# Patient Record
Sex: MALE | Race: ASIAN | NOT HISPANIC OR LATINO | Employment: FULL TIME | URBAN - METROPOLITAN AREA
[De-identification: names, ages, dates, MRNs, and addresses within clinical notes are randomized per-mention and may not be internally consistent; named-entity substitution may affect disease eponyms.]

---

## 2017-12-03 ENCOUNTER — HOSPITAL ENCOUNTER (EMERGENCY)
Facility: OTHER | Age: 22
Discharge: HOME | End: 2017-12-03
Attending: EMERGENCY MEDICINE
Payer: COMMERCIAL

## 2017-12-03 VITALS
HEIGHT: 65 IN | HEART RATE: 58 BPM | BODY MASS INDEX: 25.99 KG/M2 | RESPIRATION RATE: 18 BRPM | WEIGHT: 156 LBS | SYSTOLIC BLOOD PRESSURE: 99 MMHG | DIASTOLIC BLOOD PRESSURE: 45 MMHG | OXYGEN SATURATION: 100 % | TEMPERATURE: 98 F

## 2017-12-03 DIAGNOSIS — R10.9 ABDOMINAL PAIN: ICD-10-CM

## 2017-12-03 LAB
ALBUMIN SERPL-MCNC: 4.1 G/DL (ref 3.3–5.5)
ALBUMIN SERPL-MCNC: 4.3 G/DL (ref 3.3–5.5)
ALP SERPL-CCNC: 43 U/L (ref 42–141)
ALP SERPL-CCNC: 51 U/L (ref 42–141)
BILIRUB SERPL-MCNC: 0.6 MG/DL (ref 0.2–1.6)
BILIRUB SERPL-MCNC: 0.7 MG/DL (ref 0.2–1.6)
BUN SERPL-MCNC: 15 MG/DL (ref 7–22)
CALCIUM SERPL-MCNC: 9.5 MG/DL (ref 8–10.3)
CHLORIDE SERPL-SCNC: 100 MMOL/L (ref 98–108)
CREAT SERPL-MCNC: 1 MG/DL (ref 0.6–1.2)
GLUCOSE SERPL-MCNC: 89 MG/DL (ref 73–118)
HCO3 UR-SCNC: 26.5 MMOL/L (ref 24–28)
LDH SERPL L TO P-CCNC: 4.4 MMOL/L (ref 0.5–2.2)
PCO2 BLDA: 43.4 MMHG (ref 35–45)
PH SMN: 7.39 [PH] (ref 7.35–7.45)
PO2 BLDA: 34 MMHG (ref 40–60)
POC ALT (SGPT): 39 U/L (ref 10–47)
POC ALT (SGPT): 42 U/L (ref 10–47)
POC AMYLASE: 82 U/L (ref 14–97)
POC AST (SGOT): 67 U/L (ref 11–38)
POC AST (SGOT): 71 U/L (ref 11–38)
POC BE: 2 MMOL/L
POC GGT: 7 U/L (ref 5–65)
POC SATURATED O2: 65 % (ref 95–100)
POC TCO2: 27 MMOL/L (ref 18–33)
POC TCO2: 28 MMOL/L (ref 24–29)
POTASSIUM BLD-SCNC: 3.6 MMOL/L (ref 3.6–5.1)
PROTEIN, POC: 8 G/DL (ref 6.4–8.1)
PROTEIN, POC: 8.3 G/DL (ref 6.4–8.1)
SAMPLE: ABNORMAL
SODIUM BLD-SCNC: 142 MMOL/L (ref 128–145)

## 2017-12-03 PROCEDURE — 81003 URINALYSIS AUTO W/O SCOPE: CPT

## 2017-12-03 PROCEDURE — 96375 TX/PRO/DX INJ NEW DRUG ADDON: CPT

## 2017-12-03 PROCEDURE — 82040 ASSAY OF SERUM ALBUMIN: CPT

## 2017-12-03 PROCEDURE — 96361 HYDRATE IV INFUSION ADD-ON: CPT

## 2017-12-03 PROCEDURE — 85025 COMPLETE CBC W/AUTO DIFF WBC: CPT

## 2017-12-03 PROCEDURE — 82803 BLOOD GASES ANY COMBINATION: CPT

## 2017-12-03 PROCEDURE — 25000003 PHARM REV CODE 250: Performed by: EMERGENCY MEDICINE

## 2017-12-03 PROCEDURE — 63600175 PHARM REV CODE 636 W HCPCS: Performed by: EMERGENCY MEDICINE

## 2017-12-03 PROCEDURE — 25500020 PHARM REV CODE 255

## 2017-12-03 PROCEDURE — 99284 EMERGENCY DEPT VISIT MOD MDM: CPT | Mod: 25

## 2017-12-03 PROCEDURE — 80053 COMPREHEN METABOLIC PANEL: CPT

## 2017-12-03 PROCEDURE — 96374 THER/PROPH/DIAG INJ IV PUSH: CPT

## 2017-12-03 RX ORDER — MORPHINE SULFATE 10 MG/ML
6 INJECTION INTRAMUSCULAR; INTRAVENOUS; SUBCUTANEOUS
Status: COMPLETED | OUTPATIENT
Start: 2017-12-03 | End: 2017-12-03

## 2017-12-03 RX ORDER — ONDANSETRON 2 MG/ML
4 INJECTION INTRAMUSCULAR; INTRAVENOUS
Status: COMPLETED | OUTPATIENT
Start: 2017-12-03 | End: 2017-12-03

## 2017-12-03 RX ORDER — PANTOPRAZOLE SODIUM 20 MG/1
20 TABLET, DELAYED RELEASE ORAL DAILY
Qty: 30 TABLET | Refills: 0 | Status: SHIPPED | OUTPATIENT
Start: 2017-12-03 | End: 2018-12-03

## 2017-12-03 RX ORDER — SUCRALFATE 1 G/1
1 TABLET ORAL
Qty: 30 TABLET | Refills: 0 | Status: SHIPPED | OUTPATIENT
Start: 2017-12-03 | End: 2022-04-05 | Stop reason: CLARIF

## 2017-12-03 RX ADMIN — MORPHINE SULFATE 6 MG: 10 INJECTION INTRAVENOUS at 09:12

## 2017-12-03 RX ADMIN — ONDANSETRON 4 MG: 2 INJECTION INTRAMUSCULAR; INTRAVENOUS at 09:12

## 2017-12-03 RX ADMIN — IOHEXOL 100 ML: 350 INJECTION, SOLUTION INTRAVENOUS at 09:12

## 2017-12-03 RX ADMIN — SODIUM CHLORIDE 1000 ML: 0.9 INJECTION, SOLUTION INTRAVENOUS at 07:12

## 2017-12-03 RX ADMIN — LIDOCAINE HYDROCHLORIDE: 20 SOLUTION ORAL; TOPICAL at 07:12

## 2017-12-04 NOTE — ED TRIAGE NOTES
Pt presents to ER with c/o upper abd pain that started about 2 hours after eating accompanied by diarrhea but no nausea or vomiting.

## 2017-12-04 NOTE — ED PROVIDER NOTES
Encounter Date: 12/3/2017       History     Chief Complaint   Patient presents with    Abdominal Pain     pt presents to ED with c/o upper abd pain that started approx 2 hrs after eating with one episode of diarrhea. Denies any n/v at this time.     Diarrhea     The history is provided by the patient.   Abdominal Pain   The current episode started 2 to 3 hours ago. The onset of the illness was abrupt. The problem has not changed since onset.The abdominal pain is located in the epigastric region. The abdominal pain does not radiate. The severity of the abdominal pain is 8/10. The abdominal pain is relieved by nothing. The abdominal pain is exacerbated by eating. The other symptoms of the illness include nausea. The other symptoms of the illness do not include fever, melena, shortness of breath, vomiting, diarrhea, dysuria, hematemesis or hematochezia.   The illness is associated with eating (ate fried chicken just prior to onset of pain). Change in bowel habit: constipation earlier today. Additional symptoms associated with the illness include constipation. Symptoms associated with the illness do not include chills, urgency, hematuria, frequency or back pain.     Review of patient's allergies indicates:  No Known Allergies  History reviewed. No pertinent past medical history.  History reviewed. No pertinent surgical history.  History reviewed. No pertinent family history.  Social History   Substance Use Topics    Smoking status: Former Smoker    Smokeless tobacco: Never Used    Alcohol use Yes      Comment: ocassionally     Review of Systems   Constitutional: Negative for chills and fever.   HENT: Negative.    Respiratory: Negative for shortness of breath.    Cardiovascular: Negative for chest pain.   Gastrointestinal: Positive for abdominal pain, constipation and nausea. Negative for blood in stool, diarrhea, hematemesis, hematochezia, melena and vomiting.   Genitourinary: Negative for dysuria, frequency,  hematuria and urgency.   Musculoskeletal: Negative for back pain.   Neurological: Negative for dizziness and headaches.   All other systems reviewed and are negative.      Physical Exam     Initial Vitals [12/03/17 1908]   BP Pulse Resp Temp SpO2   -- (!) 57 18 97.6 °F (36.4 °C) 100 %      MAP       --         Physical Exam    Nursing note and vitals reviewed.  Constitutional: He appears well-developed and well-nourished. He is not diaphoretic. No distress.   HENT:   Head: Normocephalic and atraumatic.   Eyes: Conjunctivae are normal. Pupils are equal, round, and reactive to light.   Neck: Normal range of motion. Neck supple.   Cardiovascular: Regular rhythm and intact distal pulses.   Pulmonary/Chest: No accessory muscle usage. No tachypnea. No respiratory distress.   Abdominal: Soft. Bowel sounds are normal. He exhibits no distension. There is no tenderness. There is no rigidity, no rebound, no guarding, no tenderness at McBurney's point and negative Whitley's sign.   Musculoskeletal: Normal range of motion. He exhibits no tenderness.   Neurological: He is alert and oriented to person, place, and time.   Skin: Skin is warm and intact.   Psychiatric: He has a normal mood and affect.         ED Course   Procedures  Labs Reviewed   ISTAT PROCEDURE - Abnormal; Notable for the following:        Result Value    POC PO2 34 (*)     POC SATURATED O2 65 (*)     POC Lactate 4.40 (*)     All other components within normal limits   POCT LIVER PANEL - Abnormal; Notable for the following:     AST (SGOT), POC 71 (*)     Protein 8.3 (*)     All other components within normal limits   POCT CMP - Abnormal; Notable for the following:     AST (SGOT), POC 67 (*)     All other components within normal limits   POCT CBC   POCT URINALYSIS W/O SCOPE   POCT CMP   POCT LIVER PANEL             Medical Decision Making:   Differential Diagnosis:   Cholecystitis, pancreatitis, PUD, gastritis, peritonitis, others  Clinical Tests:   Lab Tests:  Ordered and Reviewed       <> Summary of Lab: White count 12.4 H&H 15.8 and 49.9 platelets 210 MCV is 84.3 RDW 12.4  Radiological Study: Ordered and Reviewed  ED Management:  I discussed with patient that the evaluation in the ED does not suggest any emergent or acutely life threatening condition requiring immediate intervention beyond what was provided in the ED, and I believe the patient is safe for discharge.  Regardless, an unremarkable evaluation in the ED does not preclude the development or presence of a serious or life threatening condition. As such, patient was instructed to return immediately for any worsening or change in current symptoms.                     ED Course as of Dec 03 2226   Sun Dec 03, 2017   2203 Symptoms improved with GI cocktail initially then recurred after several minutes. Symptoms then improved after Morphine.  [DL]      ED Course User Index  [DL] Ruth Toribio MD     Labs Reviewed  Admission on 12/03/2017   Component Date Value Ref Range Status    POC PH 12/03/2017 7.394  7.35 - 7.45 Final    POC PCO2 12/03/2017 43.4  35 - 45 mmHg Final    POC PO2 12/03/2017 34* 40 - 60 mmHg Final    POC HCO3 12/03/2017 26.5  24 - 28 mmol/L Final    POC BE 12/03/2017 2  -2 to 2 mmol/L Final    POC SATURATED O2 12/03/2017 65* 95 - 100 % Final    POC Lactate 12/03/2017 4.40* 0.5 - 2.2 mmol/L Final    POC TCO2 12/03/2017 28  24 - 29 mmol/L Final    Sample 12/03/2017 VENOUS   Final    Albumin, POC 12/03/2017 4.3  3.3 - 5.5 g/dL Final    Alkaline Phosphatase, POC 12/03/2017 51  42 - 141 U/L Final    ALT (SGPT), POC 12/03/2017 39  10 - 47 U/L Final    Amylase, POC 12/03/2017 82  14 - 97 U/L Final    AST (SGOT), POC 12/03/2017 71* 11 - 38 U/L Final    POC GGT 12/03/2017 7  5 - 65 U/L Final    Bilirubin 12/03/2017 0.6  0.2 - 1.6 mg/dL Final    Protein 12/03/2017 8.3* 6.4 - 8.1 g/dL Final    Albumin, POC 12/03/2017 4.1  3.3 - 5.5 g/dL Final    Alkaline Phosphatase, POC 12/03/2017 43  42 -  141 U/L Final    ALT (SGPT), POC 12/03/2017 42  10 - 47 U/L Final    AST (SGOT), POC 12/03/2017 67* 11 - 38 U/L Final    POC BUN 12/03/2017 15  7 - 22 mg/dL Final    Calcium, POC 12/03/2017 9.5  8.0 - 10.3 mg/dL Final    POC Chloride 12/03/2017 100  98 - 108 mmol/L Final    POC Creatinine 12/03/2017 1.0  0.6 - 1.2 mg/dL Final    POC Glucose 12/03/2017 89  73 - 118 mg/dL Final    POC Potassium 12/03/2017 3.6  3.6 - 5.1 mmol/L Final    POC Sodium 12/03/2017 142  128 - 145 mmol/L Final    Bilirubin 12/03/2017 0.7  0.2 - 1.6 mg/dL Final    POC TCO2 12/03/2017 27  18 - 33 mmol/L Final    Protein 12/03/2017 8.0  6.4 - 8.1 g/dL Final        Imaging Reviewed    Imaging Results          CT Abdomen Pelvis With Contrast (Final result)  Result time 12/03/17 21:32:50    Final result by Dain Peters MD (12/03/17 21:32:50)                 Impression:        1.  Right lower quadrant increased number of prominent but nonenlarged lymph nodes, nonspecific but could be seen with mesenteric adenitis in this age group.      Electronically signed by: DAIN PETERS MD, MD  Date:     12/03/17  Time:    21:32              Narrative:    CT of the abdomen and pelvis with 100 cc of Omnipaque 350 IV contrast. No oral contrast.    Results: Comparison made to abdominal radiograph earlier same day.    The lung bases are clear.  The visualized heart and pericardium are unremarkable.    Stomach is moderately distended with intraluminal contents without wall thickening or adjacent stranding. The liver, gallbladder, pancreas, spleen, duodenum, and adrenal glands are without significant abnormality.  No intrahepatic or extrahepatic biliary ductal dilatation. A small accessory splenule.    The kidneys are normal in size, shape and location with symmetric enhancement.  No hydronephrosis.   The urinary bladder is within normal limits.  Prostate and seminal vesicles are within normal limits.    No ascites or free air.  Increased number of  prominent but not enlarged lymph nodes within the right lower quadrant. No lymphadenopathy by CT criteria.    The appendix and terminal ileum appear within normal limits.  The small and large loops of bowel are normal in caliber without focal wall thickening or adjacent fat stranding.  No pneumatosis or portal venous gas.    The aorta tapers appropriately in its descent through the abdomen.    Well-corticated deformity at the right transverse process of L3 suggesting nonunion related to remote trauma. Chronic appearing minimal anterior wedge deformity of L1 and L2 vertebral bodies. No acute or destructive osseous process seen.                             X-Ray Abdomen Flat And Erect (Final result)  Result time 12/03/17 19:34:22    Final result by Dain Peters MD (12/03/17 19:34:22)                 Impression:         Nonobstructive bowel gas pattern.      Electronically signed by: DAIN PETERS MD, MD  Date:     12/03/17  Time:    19:34              Narrative:    Comparison: None.    Findings: Upright and supine views of the abdomen.  Nonspecific bowel gas pattern without evidence of bowel obstruction.  No small bowel air-fluid levels.  No free air.   No organomegaly or mass effect.  No abnormal intra-abdominal calcifications seen.  Osseous structures appear intact.  Visualized lung bases are clear.                              Medications given in ED    Medications   (pyxis) gi cocktail (mylanta 30 mL, lidocaine 2 % viscous 10 mL, dicyclomine 10 mL) 50 mL ( Oral Given 12/3/17 1937)   sodium chloride 0.9% bolus 1,000 mL (0 mLs Intravenous Stopped 12/3/17 2055)   omnipaque 350 iohexol 350 mg iodine/mL (100 mLs Intravenous Given 12/3/17 2115)   morphine injection 6 mg (6 mg Intravenous Given 12/3/17 2146)   ondansetron injection 4 mg (4 mg Intravenous Given 12/3/17 2147)     Discharge Medications     Medication List with Changes/Refills   New Medications    PANTOPRAZOLE (PROTONIX) 20 MG TABLET    Take 1 tablet (20  mg total) by mouth once daily.    SUCRALFATE (CARAFATE) 1 GRAM TABLET    Take 1 tablet (1 g total) by mouth 4 (four) times daily before meals and nightly.             Patient discharged to home in stable condition with instructions to:   1. Please take all meds as prescribed.  2. Follow-up with your primary care doctor   3. Return precautions discussed and patient and/or family/caretaker understands to return to the emergency room for any concerns including worsening of your current symptoms, fever, chills, night sweats, worsening pain, chest pain, shortness of breath, nausea, vomiting, diarrhea, bleeding, headache, difficulty talking, visual disturbances, weakness, numbness or any other acute concerns      Note was created using voice recognition software. Note may have occasional typographical errors that may not have been identified and edited despite good bhargav initial review prior to signing.    Clinical Impression:   The encounter diagnosis was Abdominal pain.                           Ruth Toribio MD  01/14/18 [id number]

## 2022-04-05 ENCOUNTER — HOSPITAL ENCOUNTER (EMERGENCY)
Facility: HOSPITAL | Age: 27
Discharge: HOME | End: 2022-04-05
Attending: EMERGENCY MEDICINE
Payer: COMMERCIAL

## 2022-04-05 VITALS
SYSTOLIC BLOOD PRESSURE: 131 MMHG | RESPIRATION RATE: 16 BRPM | OXYGEN SATURATION: 100 % | DIASTOLIC BLOOD PRESSURE: 79 MMHG | TEMPERATURE: 99 F | HEART RATE: 70 BPM

## 2022-04-05 DIAGNOSIS — S69.92XA INJURY OF FINGER OF LEFT HAND, INITIAL ENCOUNTER: ICD-10-CM

## 2022-04-05 DIAGNOSIS — S68.119A AMPUTATION OF TIP OF FINGER, INITIAL ENCOUNTER: ICD-10-CM

## 2022-04-05 DIAGNOSIS — S68.119A FINGERTIP AMPUTATION, INITIAL ENCOUNTER: Primary | ICD-10-CM

## 2022-04-05 DIAGNOSIS — S62.661B OPEN NONDISPLACED FRACTURE OF DISTAL PHALANX OF LEFT INDEX FINGER, INITIAL ENCOUNTER: ICD-10-CM

## 2022-04-05 PROCEDURE — 63600175 PHARM REV CODE 636 W HCPCS: Performed by: PHYSICIAN ASSISTANT

## 2022-04-05 PROCEDURE — 96365 THER/PROPH/DIAG IV INF INIT: CPT | Mod: 59

## 2022-04-05 PROCEDURE — 12002 RPR S/N/AX/GEN/TRNK2.6-7.5CM: CPT

## 2022-04-05 PROCEDURE — 25000003 PHARM REV CODE 250: Performed by: EMERGENCY MEDICINE

## 2022-04-05 PROCEDURE — 90715 TDAP VACCINE 7 YRS/> IM: CPT | Performed by: PHYSICIAN ASSISTANT

## 2022-04-05 PROCEDURE — 99284 PR EMERGENCY DEPT VISIT,LEVEL IV: ICD-10-PCS | Mod: ,,, | Performed by: PHYSICIAN ASSISTANT

## 2022-04-05 PROCEDURE — 99284 EMERGENCY DEPT VISIT MOD MDM: CPT | Mod: 25

## 2022-04-05 PROCEDURE — 90471 IMMUNIZATION ADMIN: CPT | Performed by: PHYSICIAN ASSISTANT

## 2022-04-05 PROCEDURE — 99284 EMERGENCY DEPT VISIT MOD MDM: CPT | Mod: ,,, | Performed by: PHYSICIAN ASSISTANT

## 2022-04-05 PROCEDURE — 96375 TX/PRO/DX INJ NEW DRUG ADDON: CPT | Mod: 59

## 2022-04-05 PROCEDURE — 25000003 PHARM REV CODE 250: Performed by: PHYSICIAN ASSISTANT

## 2022-04-05 RX ORDER — ONDANSETRON 4 MG/1
4 TABLET, ORALLY DISINTEGRATING ORAL EVERY 6 HOURS PRN
Qty: 15 TABLET | Refills: 0 | Status: SHIPPED | OUTPATIENT
Start: 2022-04-05 | End: 2022-09-27

## 2022-04-05 RX ORDER — HYDROCODONE BITARTRATE AND ACETAMINOPHEN 5; 325 MG/1; MG/1
1 TABLET ORAL EVERY 4 HOURS PRN
Qty: 17 TABLET | Refills: 0 | Status: SHIPPED | OUTPATIENT
Start: 2022-04-05 | End: 2022-04-08

## 2022-04-05 RX ORDER — LIDOCAINE HYDROCHLORIDE 10 MG/ML
1 INJECTION, SOLUTION EPIDURAL; INFILTRATION; INTRACAUDAL; PERINEURAL
Status: COMPLETED | OUTPATIENT
Start: 2022-04-05 | End: 2022-04-05

## 2022-04-05 RX ORDER — MORPHINE SULFATE 4 MG/ML
4 INJECTION, SOLUTION INTRAMUSCULAR; INTRAVENOUS
Status: COMPLETED | OUTPATIENT
Start: 2022-04-05 | End: 2022-04-05

## 2022-04-05 RX ORDER — LIDOCAINE HYDROCHLORIDE 10 MG/ML
20 INJECTION INFILTRATION; PERINEURAL ONCE
Status: DISCONTINUED | OUTPATIENT
Start: 2022-04-05 | End: 2022-04-05 | Stop reason: SDUPTHER

## 2022-04-05 RX ORDER — CEFAZOLIN SODIUM/D5W 2 G/100 ML
2 PLASTIC BAG, INJECTION (ML) INTRAVENOUS
Status: COMPLETED | OUTPATIENT
Start: 2022-04-05 | End: 2022-04-05

## 2022-04-05 RX ORDER — SULFAMETHOXAZOLE AND TRIMETHOPRIM 800; 160 MG/1; MG/1
1 TABLET ORAL 2 TIMES DAILY
Qty: 28 TABLET | Refills: 0 | Status: SHIPPED | OUTPATIENT
Start: 2022-04-05 | End: 2022-04-19

## 2022-04-05 RX ORDER — ONDANSETRON 2 MG/ML
4 INJECTION INTRAMUSCULAR; INTRAVENOUS
Status: COMPLETED | OUTPATIENT
Start: 2022-04-05 | End: 2022-04-05

## 2022-04-05 RX ADMIN — LIDOCAINE HYDROCHLORIDE 10 MG: 10 INJECTION, SOLUTION EPIDURAL; INFILTRATION; INTRACAUDAL at 07:04

## 2022-04-05 RX ADMIN — ONDANSETRON 4 MG: 2 INJECTION INTRAMUSCULAR; INTRAVENOUS at 06:04

## 2022-04-05 RX ADMIN — DEXTROSE 2 G: 50 INJECTION, SOLUTION INTRAVENOUS at 06:04

## 2022-04-05 RX ADMIN — TETANUS TOXOID, REDUCED DIPHTHERIA TOXOID AND ACELLULAR PERTUSSIS VACCINE, ADSORBED 0.5 ML: 5; 2.5; 8; 8; 2.5 SUSPENSION INTRAMUSCULAR at 08:04

## 2022-04-05 RX ADMIN — MORPHINE SULFATE 4 MG: 4 INJECTION INTRAVENOUS at 05:04

## 2022-04-05 NOTE — ED NOTES
LOC: The patient is awake, alert, and oriented to self, place, time, and situation. Pt is calm and cooperative. Affect is appropriate.  Speech is appropriate and clear.     APPEARANCE: Patient resting uncomfortably, left hand 2 nd finger tip amputated.    Patient is clean and well groomed.    SKIN: The skin is warm and dry; color consistent with ethnicity.  Patient has normal skin turgor and moist mucus membranes.  Skin intact; no breakdown or bruising noted.  Left hand 2 nd finger amputated.     MUSCULOSKELETAL: Patient moving upper and lower extremities without difficulty.   Denies weakness.     RESPIRATORY: Airway is open and patent. Respirations spontaneous, even, easy, and non-labored.  Patient has a normal effort and rate.  No accessory muscle use noted. Denies cough.     CARDIAC:   No peripheral edema noted. No complaints of chest pain.      ABDOMEN: Soft and non tender to palpation.  No distention noted. Pt denies abdominal pain; denies nausea, vomiting, diarrhea, or constipation.    NEUROLOGIC: Eyes open spontaneously.  Behavior appropriate to situation.  Follows commands; facial expression symmetrical.  Purposeful motor response noted; normal sensation in all extremities. Pt denies headache; denies lightheadedness or dizziness; denies visual disturbances; denies loss of balance; denies unilateral weakness.

## 2022-04-05 NOTE — Clinical Note
"Tony "Tony" Brittany was seen and treated in our emergency department on 4/5/2022.  He may return to work on 04/12/2022.       If you have any questions or concerns, please don't hesitate to call.      Lisandro Olsen PA-C"

## 2022-04-05 NOTE — ED PROVIDER NOTES
Encounter Date: 4/5/2022       History     Chief Complaint   Patient presents with    Finger Injury     L index finger and middle finger laceration from drilling into something at work.      The history is provided by the patient and medical records. No  was used.      Tony Soto is a 26 y.o. male with no reported medical history presenting to the ED with the chief complaint of left middle and index finger lacerations.     BIBEMS after accidental injury his left index and middle finger about 1 hour PTA. He was at work drilling a piece of metal into the wall. Reports the piece of metal starting to spin around and hit his fingers. Sustained a partial amputation to his left index and a laceration to his left middle finger. Arrives with piece of his finger wrapped in wet paper towels and ice packs. Received 50mcg Fentanyl during transit. Does not recall when his last tetanus was updated. Denies daily medication use. He is right hand dominant.     Review of patient's allergies indicates:  No Known Allergies  History reviewed. No pertinent past medical history.  History reviewed. No pertinent surgical history.  History reviewed. No pertinent family history.  Social History     Tobacco Use    Smoking status: Former Smoker    Smokeless tobacco: Never Used   Substance Use Topics    Alcohol use: Yes     Comment: ocassionally     Review of Systems   Constitutional: Negative for fever.   HENT: Negative for sore throat.    Eyes: Negative for pain.   Respiratory: Negative for shortness of breath.    Cardiovascular: Negative for chest pain.   Gastrointestinal: Negative for nausea.   Genitourinary: Negative for dysuria.   Musculoskeletal: Positive for arthralgias. Negative for back pain.   Skin: Positive for wound. Negative for rash.   Neurological: Negative for weakness.   Hematological: Does not bruise/bleed easily.       Physical Exam     Initial Vitals [04/05/22 1635]   BP Pulse Resp Temp SpO2   129/79  90 18 97.5 °F (36.4 °C) 98 %      MAP       --         Physical Exam    Constitutional: He appears well-developed and well-nourished. No distress.   HENT:   Head: Normocephalic and atraumatic.   Mouth/Throat: Oropharynx is clear and moist. No oropharyngeal exudate.   Eyes: EOM are normal. Pupils are equal, round, and reactive to light.   Neck: Neck supple.   Normal range of motion.  Cardiovascular: Normal rate, regular rhythm and intact distal pulses.   Pulmonary/Chest: Breath sounds normal. No respiratory distress. He has no wheezes. He has no rales.   Musculoskeletal:         General: Tenderness present.      Cervical back: Normal range of motion and neck supple.      Comments: L 2nd digit - partial amputation of distal phalanx involving finger nail. Moderate oozing blood. Intact DIP/PIP ROM.  L 3rd digit - 3.5cm semi-lunar laceration along lateral aspect of DIP joint. Intact DIP/PIP ROM. Reduced sensation to distal tip     Skin: Skin is warm and dry.     L index and long finger injuries:            ED Course   Procedures  Labs Reviewed - No data to display       Imaging Results          X-Ray Finger 2 or More Views Left (Final result)  Result time 04/05/22 20:18:59    Final result by Baron Valdez MD (04/05/22 20:18:59)                 Impression:      Open fracture amputation injury involving the distal 2nd digit left hand with distal soft tissues and the lateral aspect of the distal tuft of the distal phalanx absent.  No additional fracture identified. No dislocation    No radiopaque foreign body identified.      Electronically signed by: Baron Valdez MD  Date:    04/05/2022  Time:    20:18             Narrative:    EXAMINATION:  XR FINGER 2 OR MORE VIEWS LEFT    CLINICAL HISTORY:  L 2nd/3rd digit injury, 2nd partially amputated above DIP;    TECHNIQUE:  Second and 3rd digits left hand, three views    COMPARISON:  None    FINDINGS:  Open fracture amputation injury involving the distal 2nd digit left  hand with distal soft tissues and the lateral aspect of the distal tuft of the distal phalanx absent.  No additional fracture identified.  No dislocation    Bandages present about the distal 2nd digit.  No radiopaque foreign body seen.  Soft tissue swelling involving the distal 3rd digit.                                 Medications   morphine injection 4 mg (4 mg Intravenous Given 4/5/22 1759)   ceFAZolin 2 gram in dextrose 5% 100 mL IVPB (premix) (0 g Intravenous Stopped 4/5/22 1925)   Tdap (BOOSTRIX) vaccine injection 0.5 mL (0.5 mLs Intramuscular Given 4/5/22 2020)   ondansetron injection 4 mg (4 mg Intravenous Given 4/5/22 1848)   sodium chloride 0.9% bolus 1,000 mL (0 mLs Intravenous Stopped 4/5/22 1949)   LIDOcaine (PF) 10 mg/ml (1%) injection 10 mg (10 mg Other Given 4/5/22 1901)     Medical Decision Making:   History:   Old Medical Records: I decided to obtain old medical records.  Old Records Summarized: records from clinic visits.  Clinical Tests:   Radiological Study: Ordered and Reviewed  Other:   I have discussed this case with another health care provider.       <> Summary of the Discussion: Orthopedics       APC / Resident Notes:   26 y.o. male with no reported medical history presenting to the ED via EMS after L index and middle finger injuries occurring 1 hour PTA. Received 50mcg Fentanyl during transit.     DDx includes but not limited to amputation, laceration, open fracture, nerve injury, arterial injury, ligament injury.     6:24 PM - Lisandro Olsen PA-C  L index finger bleeding controlled with quikclot and compression wrap on ED arrival. Cefazolin and Tetanus ordered. Orthopedics consulted. Amputated piece of L finger placed in specimen cup and surrounded by ice.     8:00 PM - Lisandro Olsen PA-C  Ortho evaluated at bedside and performed washout. Long finger suture repaired. L index finger arterial injury cauterized and dressed. Advised NWB L hand and wound care instructions provided. Outpatient  follow-up in clinic and possible surgery for amputation revision and L long finger nerve injury repair. Rx for Bactrim and Norco provided. Patient expresses understanding and agreeable to the plan. Return to ED precautions given for new, worsening, or concerning symptoms. I have discussed the care of this patient with my supervising physician.                  Clinical Impression:   Final diagnoses:  [S69.92XA] Injury of finger of left hand, initial encounter  [S68.119A] Fingertip amputation, initial encounter (Primary)  [S62.661B] Open nondisplaced fracture of distal phalanx of left index finger, initial encounter          ED Disposition Condition    Discharge Stable        ED Prescriptions     Medication Sig Dispense Start Date End Date Auth. Provider    sulfamethoxazole-trimethoprim 800-160mg (BACTRIM DS) 800-160 mg Tab Take 1 tablet by mouth 2 (two) times daily. for 14 days 28 tablet 4/5/2022 4/19/2022 Lisandro Olsen PA-C    HYDROcodone-acetaminophen (NORCO) 5-325 mg per tablet Take 1 tablet by mouth every 4 (four) hours as needed for Pain. 17 tablet 4/5/2022 4/8/2022 Lisandro Olsen PA-C    ondansetron (ZOFRAN-ODT) 4 MG TbDL Take 1 tablet (4 mg total) by mouth every 6 (six) hours as needed. 15 tablet 4/5/2022  Lisandro Olsen PA-C        Follow-up Information     Follow up With Specialties Details Why Contact Info Additional Information    [facility]   [address], [address]  [facility] [address] [phone] [facility] - Main Building, 5th Floor Please park in [facility] and take Atrium elevator           Lisandro Olsen PA-C  04/05/22 9143

## 2022-04-06 ENCOUNTER — TELEPHONE (OUTPATIENT)
Dept: ORTHOPEDICS | Facility: CLINIC | Age: 27
End: 2022-04-06
Payer: COMMERCIAL

## 2022-04-06 NOTE — ASSESSMENT & PLAN NOTE
Tony Soto is a 26 y.o. male with left hand long and index finger tip injuries. No significant PMH.      -Ancef given on arrival to ED 1.5hrs after injury   -Tdap given in ED   -Bedside I&D performed with lac repair of the long finger (5-0 nylon) and washout of the index finger amputation bed   -Dressed with xeroform, gauze, and soft wrap (4x4, cast padding, ACE wrap)  -NWB left hand, keep dressing clean and dry until FU  -Follow-up with Ortho Clinic this week for wound check and possible surgery for long finger nerve repair and/or flap coverage of the index finger amputation

## 2022-04-06 NOTE — CONSULTS
[facility]  Orthopedics  Consult Note    Patient Name: Tony Soto  MRN: [id number]  Admission Date: 4/5/2022  Hospital Length of Stay: 0 days  Attending Provider: No att. providers found  Primary Care Provider: Mook Jenkins MD    Consults  Subjective:     Principal Problem:<principal problem not specified>    Chief Complaint:   Chief Complaint   Patient presents with    Finger Injury     L index finger and middle finger laceration from drilling into something at work.         HPI: Tony Soto is a 26 y.o. 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 On presentation there is a finger tip amputation of the left index finger (distal to the DIP) and a large laceration on the volar aspect of the long finger at the level of the DIP. He has numbness to the radial side of the long finger distal to the laceration and an arterial bleed from the bed of the index finger amputation. FDS, FDP, and EDL are intact in all fingers. He denies other injuries. He is not sure of his tetanus vaccine status.       History reviewed. No pertinent past medical history.    History reviewed. No pertinent surgical history.    Review of patient's allergies indicates:  No Known Allergies    No current facility-administered medications for this encounter.     Current Outpatient Medications   Medication Sig    HYDROcodone-acetaminophen (NORCO) 5-325 mg per tablet Take 1 tablet by mouth every 4 (four) hours as needed for Pain.    ondansetron (ZOFRAN-ODT) 4 MG TbDL Take 1 tablet (4 mg total) by mouth every 6 (six) hours as needed.    pantoprazole (PROTONIX) 20 MG tablet Take 1 tablet (20 mg total) by mouth once daily.    sulfamethoxazole-trimethoprim 800-160mg (BACTRIM DS) 800-160 mg Tab Take 1 tablet by mouth 2 (two) times daily. for 14 days     Family History    None       Tobacco Use    Smoking status: Former Smoker    Smokeless tobacco:  Never Used   Substance and Sexual Activity    Alcohol use: Yes     Comment: ocassionally    Drug use: Not on file    Sexual activity: Not on file     ROS  Constitutional: negative for fevers/chills/night sweats  Eyes: no acute visual changes  ENT: negative acute  for hearing loss  Respiratory: negative for dyspnea  Cardiovascular: negative for chest pain  Gastrointestinal: negative for abdominal pain  Genitourinary: negative for dysuria  Neurological: negative for headaches  Behavioral/Psych: negative for hallucinations  Endocrine: negative for temperature intolerance  MSK: per HPI    Objective:     Vital Signs (Most Recent):  Temp: 97.5 °F (36.4 °C) (04/05/22 1635)  Pulse: 90 (04/05/22 1635)  Resp: 18 (04/05/22 1759)  BP: 129/79 (04/05/22 1635)  SpO2: 98 % (04/05/22 1635) Vital Signs (24h Range):  Temp:  [97.5 °F (36.4 °C)] 97.5 °F (36.4 °C)  Pulse:  [90] 90  Resp:  [18] 18  SpO2:  [98 %] 98 %  BP: (129)/(79) 129/79           There is no height or weight on file to calculate BMI.      Intake/Output Summary (Last 24 hours) at 4/5/2022 2041  Last data filed at 4/5/2022 1949  Gross per 24 hour   Intake 999 ml   Output --   Net 999 ml       Ortho/SPM Exam  Left hand exam   Index finger with oblique amputation from volar to the dip through the distal phalanx and the mid sterile matrix  Deep laceration on the volar and radial sides of the long finger   Numbness to the radial distal phalanx of the long finger distal to the laceration   Radial and Ulnar pulses 2+  FDS, FDP, FPL, EPL, EDC, EDM, LEELEE, Interossei intact   Cap refill 2+ in all fingers             Significant Labs: All pertinent labs within the past 24 hours have been reviewed.    Significant Imaging: I have reviewed all pertinent imaging results/findings.  Left hand Xray with oblique amputation of the distal index finger and soft tissue defect in the distal long finger     Assessment/Plan:     Amputation of finger tip  Tony Soto is a 26 y.o. male with left  hand long and index finger tip injuries. No significant PMH.      -Ancef given on arrival to ED 1.5hrs after injury   -Tdap given in ED   -Bedside I&D performed with lac repair of the long finger (5-0 nylon) and washout of the index finger amputation bed   -Dressed with xeroform, gauze, and soft wrap (4x4, cast padding, ACE wrap)  -NWB left hand, keep dressing clean and dry until FU  -Follow-up with Ortho Clinic this week for wound check and possible surgery for long finger nerve repair and/or flap coverage of the index finger amputation   - Recommend Bactrim at discharge for infection prophylaxis             Sae Bob MD  Orthopedics  [facility] - [facility] Dept

## 2022-04-06 NOTE — HPI
Tony Soto is a 26 y.o. 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 On presentation there is a finger tip amputation of the left index finger (distal to the DIP) and a large laceration on the volar aspect of the long finger at the level of the DIP. He has numbness to the radial side of the long finger distal to the laceration and an arterial bleed from the bed of the index finger amputation. FDS, FDP, and EDL are intact in all fingers. He denies other injuries. He is not sure of his tetanus vaccine status.

## 2022-04-06 NOTE — TELEPHONE ENCOUNTER
Spoke c pt. Offered and confirmed appt location & time c Dr. Sandra 04/07/22. Asked pt if he was planning to claim WC for the injury. Pt stated that he would not. Pt expressed understanding & was thankful.

## 2022-04-06 NOTE — SUBJECTIVE & OBJECTIVE
History reviewed. No pertinent past medical history.    History reviewed. No pertinent surgical history.    Review of patient's allergies indicates:  No Known Allergies    No current facility-administered medications for this encounter.     Current Outpatient Medications   Medication Sig    HYDROcodone-acetaminophen (NORCO) 5-325 mg per tablet Take 1 tablet by mouth every 4 (four) hours as needed for Pain.    ondansetron (ZOFRAN-ODT) 4 MG TbDL Take 1 tablet (4 mg total) by mouth every 6 (six) hours as needed.    pantoprazole (PROTONIX) 20 MG tablet Take 1 tablet (20 mg total) by mouth once daily.    sulfamethoxazole-trimethoprim 800-160mg (BACTRIM DS) 800-160 mg Tab Take 1 tablet by mouth 2 (two) times daily. for 14 days     Family History    None       Tobacco Use    Smoking status: Former Smoker    Smokeless tobacco: Never Used   Substance and Sexual Activity    Alcohol use: Yes     Comment: ocassionally    Drug use: Not on file    Sexual activity: Not on file     ROS  Constitutional: negative for fevers/chills/night sweats  Eyes: no acute visual changes  ENT: negative acute  for hearing loss  Respiratory: negative for dyspnea  Cardiovascular: negative for chest pain  Gastrointestinal: negative for abdominal pain  Genitourinary: negative for dysuria  Neurological: negative for headaches  Behavioral/Psych: negative for hallucinations  Endocrine: negative for temperature intolerance  MSK: per HPI    Objective:     Vital Signs (Most Recent):  Temp: 97.5 °F (36.4 °C) (04/05/22 1635)  Pulse: 90 (04/05/22 1635)  Resp: 18 (04/05/22 1759)  BP: 129/79 (04/05/22 1635)  SpO2: 98 % (04/05/22 1635) Vital Signs (24h Range):  Temp:  [97.5 °F (36.4 °C)] 97.5 °F (36.4 °C)  Pulse:  [90] 90  Resp:  [18] 18  SpO2:  [98 %] 98 %  BP: (129)/(79) 129/79           There is no height or weight on file to calculate BMI.      Intake/Output Summary (Last 24 hours) at 4/5/2022 2041  Last data filed at 4/5/2022 1949  Gross per 24 hour   Intake  999 ml   Output --   Net 999 ml       Ortho/SPM Exam  Left hand exam   Index finger with oblique amputation from volar to the dip through the distal phalanx and the mid sterile matrix  Deep laceration on the volar and radial sides of the long finger   Numbness to the radial distal phalanx of the long finger distal to the laceration   Radial and Ulnar pulses 2+  FDS, FDP, FPL, EPL, EDC, EDM, LEELEE, Interossei intact   Cap refill 2+ in all fingers             Significant Labs: All pertinent labs within the past 24 hours have been reviewed.    Significant Imaging: I have reviewed all pertinent imaging results/findings.  Left hand Xray with oblique amputation of the distal index finger and soft tissue defect in the distal long finger    none

## 2022-04-06 NOTE — DISCHARGE INSTRUCTIONS
Follow-up with orthopedics for further management of your finger injury. Use the below contact information to call their clinic.     Take the prescribed Bactrim to prevent development of infection.     Return to the emergency room for new, worsening, or concerning symptoms.

## 2022-04-07 ENCOUNTER — OFFICE VISIT (OUTPATIENT)
Dept: ORTHOPEDICS | Facility: CLINIC | Age: 27
End: 2022-04-07
Payer: COMMERCIAL

## 2022-04-07 VITALS
WEIGHT: 170 LBS | DIASTOLIC BLOOD PRESSURE: 70 MMHG | SYSTOLIC BLOOD PRESSURE: 127 MMHG | BODY MASS INDEX: 28.32 KG/M2 | HEIGHT: 65 IN

## 2022-04-07 DIAGNOSIS — S69.91XA HAND INJURY, RIGHT, INITIAL ENCOUNTER: ICD-10-CM

## 2022-04-07 DIAGNOSIS — S69.91XA HAND INJURY, RIGHT, INITIAL ENCOUNTER: Primary | ICD-10-CM

## 2022-04-07 DIAGNOSIS — S68.119A: Primary | ICD-10-CM

## 2022-04-07 PROCEDURE — 3008F BODY MASS INDEX DOCD: CPT | Mod: CPTII,S$GLB,, | Performed by: ORTHOPAEDIC SURGERY

## 2022-04-07 PROCEDURE — 3078F DIAST BP <80 MM HG: CPT | Mod: CPTII,S$GLB,, | Performed by: ORTHOPAEDIC SURGERY

## 2022-04-07 PROCEDURE — 99999 PR PBB SHADOW E&M-EST. PATIENT-LVL III: CPT | Mod: PBBFAC,,, | Performed by: ORTHOPAEDIC SURGERY

## 2022-04-07 PROCEDURE — 3074F PR MOST RECENT SYSTOLIC BLOOD PRESSURE < 130 MM HG: ICD-10-PCS | Mod: CPTII,S$GLB,, | Performed by: ORTHOPAEDIC SURGERY

## 2022-04-07 PROCEDURE — 1159F PR MEDICATION LIST DOCUMENTED IN MEDICAL RECORD: ICD-10-PCS | Mod: CPTII,S$GLB,, | Performed by: ORTHOPAEDIC SURGERY

## 2022-04-07 PROCEDURE — 99204 OFFICE O/P NEW MOD 45 MIN: CPT | Mod: 57,S$GLB,, | Performed by: ORTHOPAEDIC SURGERY

## 2022-04-07 PROCEDURE — 99204 PR OFFICE/OUTPT VISIT, NEW, LEVL IV, 45-59 MIN: ICD-10-PCS | Mod: 57,S$GLB,, | Performed by: ORTHOPAEDIC SURGERY

## 2022-04-07 PROCEDURE — 99999 PR PBB SHADOW E&M-EST. PATIENT-LVL III: ICD-10-PCS | Mod: PBBFAC,,, | Performed by: ORTHOPAEDIC SURGERY

## 2022-04-07 PROCEDURE — 1159F MED LIST DOCD IN RCRD: CPT | Mod: CPTII,S$GLB,, | Performed by: ORTHOPAEDIC SURGERY

## 2022-04-07 PROCEDURE — 3008F PR BODY MASS INDEX (BMI) DOCUMENTED: ICD-10-PCS | Mod: CPTII,S$GLB,, | Performed by: ORTHOPAEDIC SURGERY

## 2022-04-07 PROCEDURE — 3078F PR MOST RECENT DIASTOLIC BLOOD PRESSURE < 80 MM HG: ICD-10-PCS | Mod: CPTII,S$GLB,, | Performed by: ORTHOPAEDIC SURGERY

## 2022-04-07 PROCEDURE — 3074F SYST BP LT 130 MM HG: CPT | Mod: CPTII,S$GLB,, | Performed by: ORTHOPAEDIC SURGERY

## 2022-04-07 NOTE — PROGRESS NOTES
Plan for possible cross-finger flap of index finger with skin graft long finger radial nerve exploration on the long finger as well as extensor tendon repair long finger risks benefits were explained the patient in clinic I did discuss going ahead and amputating the finger tip in doing a revision amputation on let him think about it but we will consent him for cross-finger flap to maintain length patient understands agrees with this plan they had questions about fingernail growth he does have still a bit the cuticle left proximally however the nail bed itself is almost completely gone we talked about hook nail possibility patient understands he will give me his decision on Monday

## 2022-04-07 NOTE — PROGRESS NOTES
H&P  Orthopaedics    SUBJECTIVE:     CC: left middle and index finger lacerations    History of Present Illness:  Tony Soto is a 26 y.o. male who presents with left middle and index finger lacerations after getting it caught in machinery while working on his car 2 days ago. He was seen in the ED where it was washed out. He also received IV abx at that time. The long finger was able to be primarily closed. He has no feeling distal to this laceration on the radial aspect of the long finger. His index finger has a volar oblique       Review of patient's allergies indicates:  No Known Allergies    History reviewed. No pertinent past medical history.  History reviewed. No pertinent surgical history.  No family history on file.  Social History     Tobacco Use    Smoking status: Former Smoker    Smokeless tobacco: Never Used   Substance Use Topics    Alcohol use: Yes     Comment: ocassionally        Review of Systems:  Patient denies constitutional symptoms, cardiac symptoms, respiratory symptoms, GI symptoms.  The remainder of the musculoskeletal ROS is included in the HPI.      OBJECTIVE:     Vital Signs (Most Recent)  BP: 127/70 (04/07/22 0838)    Physical Exam:  Gen:  No acute distress  CV:  Peripherally well-perfused.  Pulses 2+ bilaterally.  Lungs:  Normal respiratory effort.  Abdomen:  Soft, non-tender, non-distended  Head/Neck:  Normocephalic.  Atraumatic. No TTP, AROM and PROM intact without pain  Neuro:  CN intact without deficit, SILT throughout B/L Upper & Lower Extremities    MSK:  LUE:  - volar oblique fingertip amputation distal to DIP with nailbed involvement (index)  - radial laceration with suture long finger with numbness distal to laceration on radial aspect of finger  - Radial & Ulnar arteries palpated 2+  - Capillary Refill <3s      Diagnostic Results:  Xray of the left hand  Impression:  Fingertip amputation of the index finger with P3 involvement    EMG - none    ASSESSMENT/PLAN:     A/P: Tony  Brittany is a 26 y.o. male  with left index fingertip partial amputation, possible tendon injuries of index and long fingers, possible digital nerve injury of long finger  Discussed with patient that he will need surgery for these injuries. Consents signed today. Plan for nerve repair of long finger, fingertip amputation revision with cross finger flap. Plan for surgery next week.         Leland Villasenor M.D.   Orthopedic Surgery Resident

## 2022-04-07 NOTE — PROCEDURES
Tony Soto is a 26 y.o. male patient.    Temp: 98.5 °F (36.9 °C) (04/05/22 2047)  Pulse: 70 (04/05/22 2047)  Resp: 16 (04/05/22 2047)  BP: 131/79 (04/05/22 2047)  SpO2: 100 % (04/05/22 2047)       Orthopedic Injury Treatment    Date/Time: 4/7/2022 10:23 AM  Performed by: Sae Bob MD  Authorized by: Sae Bob MD     Location procedure was performed:  [facility] EMERGENCY DEPARTMENT  Pre-operative diagnosis:  Left long finger laceration, Left Index finger distal tip amputation   Post-operative diagnosis:  Left long finger laceration, Left Index finger distal tip amputation       Pre-procedure assessment:     Distal perfusion: normal      Local anesthesia used?: Yes      Anesthesia:  Digital block    Local anesthetic:  Lidocaine 1% without epinephrine      Selections made in this section will also lock the Injury type section above.:     Immobilization: Soft dressing, cast padding, ace wrap.    Technical Procedures Used:  Repair of left long finger laceration with simple 5-0 Nylon, washout and cauterization of bleeding vessels for the left index finger amputation site     Complications: No      Specimens: No      Implants: No    Post-procedure assessment:     Distal perfusion: normal       On initial exam there was left long finger numbness to the distal radial tip and arterial bleeding from the amputation site of the left index finger. Fingers were blocked with 10ml 1% lidocaine without epi. Bleeding vessels were cauterized at bedside in the ED, all injuries were washed with betadine and 2L NS. Long finger was closed with 5-0 Nylon and Index finger was covered with Xeroform. Hand was wrapped with cast padding and ACE wrap.          4/7/2022

## 2022-04-08 ENCOUNTER — TELEPHONE (OUTPATIENT)
Dept: ORTHOPEDICS | Facility: CLINIC | Age: 27
End: 2022-04-08
Payer: COMMERCIAL

## 2022-04-08 ENCOUNTER — TELEPHONE (OUTPATIENT)
Dept: PHARMACY | Facility: CLINIC | Age: 27
End: 2022-04-08
Payer: COMMERCIAL

## 2022-04-08 DIAGNOSIS — Z98.890 POST-OPERATIVE STATE: Primary | ICD-10-CM

## 2022-04-08 RX ORDER — HYDROCODONE BITARTRATE AND ACETAMINOPHEN 5; 325 MG/1; MG/1
1 TABLET ORAL EVERY 6 HOURS PRN
Qty: 20 TABLET | Refills: 0 | Status: SHIPPED | OUTPATIENT
Start: 2022-04-08 | End: 2022-09-16 | Stop reason: SDUPTHER

## 2022-04-08 NOTE — TELEPHONE ENCOUNTER
Spoke c pt. Informed pt of 1200 arrival time for 04/11/22 surgery at the [facility]. Reminded pt of NPO status. Pt expressed understanding & was thankful.

## 2022-04-09 DIAGNOSIS — S68.119A FINGERTIP AMPUTATION: ICD-10-CM

## 2022-04-09 RX ORDER — MUPIROCIN 20 MG/G
OINTMENT TOPICAL
Status: CANCELLED | OUTPATIENT
Start: 2022-04-09

## 2022-04-11 ENCOUNTER — ANESTHESIA (OUTPATIENT)
Dept: SURGERY | Facility: HOSPITAL | Age: 27
End: 2022-04-11
Payer: COMMERCIAL

## 2022-04-11 ENCOUNTER — TELEPHONE (OUTPATIENT)
Dept: ORTHOPEDICS | Facility: CLINIC | Age: 27
End: 2022-04-11
Payer: COMMERCIAL

## 2022-04-11 ENCOUNTER — HOSPITAL ENCOUNTER (OUTPATIENT)
Facility: HOSPITAL | Age: 27
Discharge: HOME | End: 2022-04-11
Attending: ORTHOPAEDIC SURGERY | Admitting: ORTHOPAEDIC SURGERY
Payer: COMMERCIAL

## 2022-04-11 ENCOUNTER — ANESTHESIA EVENT (OUTPATIENT)
Dept: SURGERY | Facility: HOSPITAL | Age: 27
End: 2022-04-11
Payer: COMMERCIAL

## 2022-04-11 VITALS
WEIGHT: 165 LBS | OXYGEN SATURATION: 99 % | DIASTOLIC BLOOD PRESSURE: 67 MMHG | TEMPERATURE: 99 F | HEART RATE: 70 BPM | RESPIRATION RATE: 15 BRPM | BODY MASS INDEX: 26.52 KG/M2 | HEIGHT: 66 IN | SYSTOLIC BLOOD PRESSURE: 118 MMHG

## 2022-04-11 DIAGNOSIS — S68.119A FINGERTIP AMPUTATION: ICD-10-CM

## 2022-04-11 DIAGNOSIS — S69.91XA HAND INJURY, RIGHT, INITIAL ENCOUNTER: Primary | ICD-10-CM

## 2022-04-11 LAB
CTP QC/QA: YES
SARS-COV-2 AG RESP QL IA.RAPID: NEGATIVE

## 2022-04-11 PROCEDURE — 26418 REPAIR FINGER TENDON: CPT | Mod: 51,F2,, | Performed by: ORTHOPAEDIC SURGERY

## 2022-04-11 PROCEDURE — D9220A PRA ANESTHESIA: Mod: CRNA,,, | Performed by: NURSE ANESTHETIST, CERTIFIED REGISTERED

## 2022-04-11 PROCEDURE — C1762 CONN TISS, HUMAN(INC FASCIA): HCPCS | Performed by: ORTHOPAEDIC SURGERY

## 2022-04-11 PROCEDURE — D9220A PRA ANESTHESIA: ICD-10-PCS | Mod: ANES,,, | Performed by: ANESTHESIOLOGY

## 2022-04-11 PROCEDURE — 99900035 HC TECH TIME PER 15 MIN (STAT)

## 2022-04-11 PROCEDURE — 25000003 PHARM REV CODE 250: Performed by: NURSE ANESTHETIST, CERTIFIED REGISTERED

## 2022-04-11 PROCEDURE — D9220A PRA ANESTHESIA: Mod: ANES,,, | Performed by: ANESTHESIOLOGY

## 2022-04-11 PROCEDURE — 25000003 PHARM REV CODE 250: Performed by: ORTHOPAEDIC SURGERY

## 2022-04-11 PROCEDURE — 37000009 HC ANESTHESIA EA ADD 15 MINS: Performed by: ORTHOPAEDIC SURGERY

## 2022-04-11 PROCEDURE — 27800903 OPTIME MED/SURG SUP & DEVICES OTHER IMPLANTS: Performed by: ORTHOPAEDIC SURGERY

## 2022-04-11 PROCEDURE — 71000015 HC POSTOP RECOV 1ST HR: Performed by: ORTHOPAEDIC SURGERY

## 2022-04-11 PROCEDURE — D9220A PRA ANESTHESIA: ICD-10-PCS | Mod: CRNA,,, | Performed by: NURSE ANESTHETIST, CERTIFIED REGISTERED

## 2022-04-11 PROCEDURE — 36000707: Performed by: ORTHOPAEDIC SURGERY

## 2022-04-11 PROCEDURE — 63600175 PHARM REV CODE 636 W HCPCS: Performed by: STUDENT IN AN ORGANIZED HEALTH CARE EDUCATION/TRAINING PROGRAM

## 2022-04-11 PROCEDURE — 26418 PR REPAIR EXTEN TENDON,DORSUM FINGR,EA: ICD-10-PCS | Mod: 51,F2,, | Performed by: ORTHOPAEDIC SURGERY

## 2022-04-11 PROCEDURE — 25000003 PHARM REV CODE 250: Performed by: STUDENT IN AN ORGANIZED HEALTH CARE EDUCATION/TRAINING PROGRAM

## 2022-04-11 PROCEDURE — 37000008 HC ANESTHESIA 1ST 15 MINUTES: Performed by: ORTHOPAEDIC SURGERY

## 2022-04-11 PROCEDURE — 14040 TIS TRNFR F/C/C/M/N/A/G/H/F: CPT | Mod: 51,F1,, | Performed by: ORTHOPAEDIC SURGERY

## 2022-04-11 PROCEDURE — 63600175 PHARM REV CODE 636 W HCPCS: Performed by: NURSE ANESTHETIST, CERTIFIED REGISTERED

## 2022-04-11 PROCEDURE — 14040 PR ADJ TISS XFER HEAD,FAC,HAND <10 SQCM: ICD-10-PCS | Mod: 51,F1,, | Performed by: ORTHOPAEDIC SURGERY

## 2022-04-11 PROCEDURE — 71000033 HC RECOVERY, INTIAL HOUR: Performed by: ORTHOPAEDIC SURGERY

## 2022-04-11 PROCEDURE — 94761 N-INVAS EAR/PLS OXIMETRY MLT: CPT

## 2022-04-11 PROCEDURE — 64910 NERVE REPAIR W/ALLOGRAFT: CPT | Mod: F2,,, | Performed by: ORTHOPAEDIC SURGERY

## 2022-04-11 PROCEDURE — 27201423 OPTIME MED/SURG SUP & DEVICES STERILE SUPPLY: Performed by: ORTHOPAEDIC SURGERY

## 2022-04-11 PROCEDURE — 36000706: Performed by: ORTHOPAEDIC SURGERY

## 2022-04-11 PROCEDURE — 25000003 PHARM REV CODE 250: Performed by: ANESTHESIOLOGY

## 2022-04-11 PROCEDURE — 64910 PR NERVE REPAIR W/ALLOGRAFT OR SYNTH: ICD-10-PCS | Mod: F2,,, | Performed by: ORTHOPAEDIC SURGERY

## 2022-04-11 DEVICE — CONNECTOR NRV AXOGUARD 3X15MM: Type: IMPLANTABLE DEVICE | Site: FINGER | Status: FUNCTIONAL

## 2022-04-11 RX ORDER — CEFAZOLIN SODIUM/WATER 2 G/20 ML
2 SYRINGE (ML) INTRAVENOUS
Status: COMPLETED | OUTPATIENT
Start: 2022-04-11 | End: 2022-04-11

## 2022-04-11 RX ORDER — LIDOCAINE HYDROCHLORIDE 20 MG/ML
INJECTION INTRAVENOUS
Status: DISCONTINUED | OUTPATIENT
Start: 2022-04-11 | End: 2022-04-11

## 2022-04-11 RX ORDER — PROPOFOL 10 MG/ML
VIAL (ML) INTRAVENOUS CONTINUOUS PRN
Status: DISCONTINUED | OUTPATIENT
Start: 2022-04-11 | End: 2022-04-11

## 2022-04-11 RX ORDER — MUPIROCIN 20 MG/G
OINTMENT TOPICAL
Status: DISPENSED
Start: 2022-04-11 | End: 2022-04-11

## 2022-04-11 RX ORDER — BACITRACIN ZINC 500 UNIT/G
OINTMENT (GRAM) TOPICAL
Status: DISCONTINUED | OUTPATIENT
Start: 2022-04-11 | End: 2022-04-11 | Stop reason: HOSPADM

## 2022-04-11 RX ORDER — ACETAMINOPHEN 500 MG
TABLET ORAL
Status: DISPENSED
Start: 2022-04-11 | End: 2022-04-11

## 2022-04-11 RX ORDER — CARBOXYMETHYLCELLULOSE SODIUM 10 MG/ML
GEL OPHTHALMIC
Status: DISCONTINUED | OUTPATIENT
Start: 2022-04-11 | End: 2022-04-11

## 2022-04-11 RX ORDER — ACETAMINOPHEN 500 MG
1000 TABLET ORAL ONCE
Status: COMPLETED | OUTPATIENT
Start: 2022-04-11 | End: 2022-04-11

## 2022-04-11 RX ORDER — DEXAMETHASONE SODIUM PHOSPHATE 4 MG/ML
INJECTION, SOLUTION INTRA-ARTICULAR; INTRALESIONAL; INTRAMUSCULAR; INTRAVENOUS; SOFT TISSUE
Status: DISCONTINUED | OUTPATIENT
Start: 2022-04-11 | End: 2022-04-11

## 2022-04-11 RX ORDER — SODIUM CHLORIDE 0.9 % (FLUSH) 0.9 %
3 SYRINGE (ML) INJECTION
Status: DISCONTINUED | OUTPATIENT
Start: 2022-04-11 | End: 2022-04-11 | Stop reason: HOSPADM

## 2022-04-11 RX ORDER — FENTANYL CITRATE 50 UG/ML
INJECTION, SOLUTION INTRAMUSCULAR; INTRAVENOUS
Status: DISCONTINUED | OUTPATIENT
Start: 2022-04-11 | End: 2022-04-11

## 2022-04-11 RX ORDER — CELECOXIB 200 MG/1
CAPSULE ORAL
Status: DISPENSED
Start: 2022-04-11 | End: 2022-04-11

## 2022-04-11 RX ORDER — MIDAZOLAM HYDROCHLORIDE 1 MG/ML
INJECTION, SOLUTION INTRAMUSCULAR; INTRAVENOUS
Status: DISCONTINUED | OUTPATIENT
Start: 2022-04-11 | End: 2022-04-11

## 2022-04-11 RX ORDER — MUPIROCIN 20 MG/G
OINTMENT TOPICAL
Status: DISCONTINUED | OUTPATIENT
Start: 2022-04-11 | End: 2022-04-11 | Stop reason: HOSPADM

## 2022-04-11 RX ORDER — SODIUM CHLORIDE 9 MG/ML
INJECTION, SOLUTION INTRAVENOUS CONTINUOUS
Status: DISCONTINUED | OUTPATIENT
Start: 2022-04-11 | End: 2022-04-11 | Stop reason: HOSPADM

## 2022-04-11 RX ORDER — CELECOXIB 200 MG/1
400 CAPSULE ORAL DAILY
Status: DISCONTINUED | OUTPATIENT
Start: 2022-04-11 | End: 2022-04-11 | Stop reason: HOSPADM

## 2022-04-11 RX ORDER — LIDOCAINE HYDROCHLORIDE 10 MG/ML
INJECTION, SOLUTION EPIDURAL; INFILTRATION; INTRACAUDAL; PERINEURAL
Status: DISCONTINUED | OUTPATIENT
Start: 2022-04-11 | End: 2022-04-11 | Stop reason: HOSPADM

## 2022-04-11 RX ORDER — PROPOFOL 10 MG/ML
VIAL (ML) INTRAVENOUS
Status: DISCONTINUED | OUTPATIENT
Start: 2022-04-11 | End: 2022-04-11

## 2022-04-11 RX ORDER — KETAMINE HCL IN 0.9 % NACL 50 MG/5 ML
SYRINGE (ML) INTRAVENOUS
Status: DISCONTINUED | OUTPATIENT
Start: 2022-04-11 | End: 2022-04-11

## 2022-04-11 RX ORDER — BUPIVACAINE HYDROCHLORIDE 2.5 MG/ML
INJECTION, SOLUTION EPIDURAL; INFILTRATION; INTRACAUDAL
Status: DISCONTINUED | OUTPATIENT
Start: 2022-04-11 | End: 2022-04-11 | Stop reason: HOSPADM

## 2022-04-11 RX ORDER — FAMOTIDINE 10 MG/ML
INJECTION INTRAVENOUS
Status: DISCONTINUED | OUTPATIENT
Start: 2022-04-11 | End: 2022-04-11

## 2022-04-11 RX ADMIN — FENTANYL CITRATE 25 MCG: 50 INJECTION, SOLUTION INTRAMUSCULAR; INTRAVENOUS at 03:04

## 2022-04-11 RX ADMIN — FENTANYL CITRATE 25 MCG: 50 INJECTION, SOLUTION INTRAMUSCULAR; INTRAVENOUS at 02:04

## 2022-04-11 RX ADMIN — LIDOCAINE HYDROCHLORIDE 80 MG: 20 INJECTION, SOLUTION INTRAVENOUS at 01:04

## 2022-04-11 RX ADMIN — SODIUM CHLORIDE, SODIUM GLUCONATE, SODIUM ACETATE, POTASSIUM CHLORIDE, MAGNESIUM CHLORIDE, SODIUM PHOSPHATE, DIBASIC, AND POTASSIUM PHOSPHATE: .53; .5; .37; .037; .03; .012; .00082 INJECTION, SOLUTION INTRAVENOUS at 02:04

## 2022-04-11 RX ADMIN — CELECOXIB 400 MG: 200 CAPSULE ORAL at 12:04

## 2022-04-11 RX ADMIN — Medication 20 MG: at 01:04

## 2022-04-11 RX ADMIN — CARBOXYMETHYLCELLULOSE SODIUM 4 DROP: 10 GEL OPHTHALMIC at 01:04

## 2022-04-11 RX ADMIN — PROPOFOL 40 MG: 10 INJECTION, EMULSION INTRAVENOUS at 01:04

## 2022-04-11 RX ADMIN — PROPOFOL 125 MCG/KG/MIN: 10 INJECTION, EMULSION INTRAVENOUS at 01:04

## 2022-04-11 RX ADMIN — FENTANYL CITRATE 50 MCG: 50 INJECTION, SOLUTION INTRAMUSCULAR; INTRAVENOUS at 01:04

## 2022-04-11 RX ADMIN — Medication 10 MG: at 03:04

## 2022-04-11 RX ADMIN — PROPOFOL 20 MG: 10 INJECTION, EMULSION INTRAVENOUS at 01:04

## 2022-04-11 RX ADMIN — ACETAMINOPHEN 1000 MG: 500 TABLET ORAL at 12:04

## 2022-04-11 RX ADMIN — MUPIROCIN: 20 OINTMENT TOPICAL at 12:04

## 2022-04-11 RX ADMIN — FAMOTIDINE 20 MG: 10 INJECTION, SOLUTION INTRAVENOUS at 02:04

## 2022-04-11 RX ADMIN — SODIUM CHLORIDE: 0.9 INJECTION, SOLUTION INTRAVENOUS at 12:04

## 2022-04-11 RX ADMIN — Medication 2 G: at 01:04

## 2022-04-11 RX ADMIN — MIDAZOLAM HYDROCHLORIDE 2 MG: 1 INJECTION, SOLUTION INTRAMUSCULAR; INTRAVENOUS at 01:04

## 2022-04-11 RX ADMIN — DEXAMETHASONE SODIUM PHOSPHATE 4 MG: 4 INJECTION, SOLUTION INTRAMUSCULAR; INTRAVENOUS at 02:04

## 2022-04-11 NOTE — PATIENT INSTRUCTIONS
Non weight bearing with operative hand  Keep dressing/splint clean and intact  Do not get dressing/splint wet  Elevate and ice frequently throughout the day  Take medications as prescribed  Call clinic with any questions or concerns

## 2022-04-11 NOTE — PLAN OF CARE
Obtained report from SOL Curtis.  Patient febrile and slightly tachypneic in pre-op.  COVID negative.  Convened with Nicole Chavez RN for plan.  IV started, confirmed pt has had no tylenol today, confirmed last dose of Bactrim today at 0730am   Oral temp 100.3 prior to administration.  Pre-op meds administered.  Patient placed on pulse ox.  Significant other at bedside.  Side rails up X2 call light in reach.

## 2022-04-11 NOTE — TRANSFER OF CARE
"Anesthesia Transfer of Care Note    Patient: Tony Soto    Procedure(s) Performed: Procedure(s) (LRB):  FLAP GRAFT, left cross finger flap index finger (Left)  REPAIR, TENDON, HAND, left long/index extensor tendon (Left)  REPAIR, NERVE, FINGER, left long finger (Left)  SURGICAL PROCUREMENT, GRAFT, SKIN (Left)    Patient location: PACU    Anesthesia Type: general    Transport from OR: Transported from OR on room air with adequate spontaneous ventilation    Post pain: adequate analgesia    Post assessment: no apparent anesthetic complications and tolerated procedure well    Post vital signs: stable    Level of consciousness: awake    Nausea/Vomiting: no nausea/vomiting    Complications: none    Transfer of care protocol was followed      Last vitals:   Visit Vitals  /65   Pulse 83   Temp 37.3 °C (99.1 °F) (Oral)   Resp 16   Ht 5' 6" (1.676 m)   Wt 74.8 kg (165 lb)   SpO2 97%   BMI 26.63 kg/m²     "

## 2022-04-11 NOTE — DISCHARGE INSTRUCTIONS
AFTER HAND SURGERY    DOS:  Keep affected wrist elevated above the level of your heart  Check circulation frequently in fingers by pressing on the nail bed. Nail bed should turn white and then pink when released.  Exercise fingers to promote circulation.  Advance diet as tolerated  Resume home medications today.      DONT:  No driving for 24 hours or while taking narcotic pain medication  DO NOT TAKE ADDITIONAL TYLENOL/ACETAMINOPHEN WHILE TAKING NARCOTIC PAIN MEDICATION THAT CONTAINS TYLENOL/ACETAMINOPHEN.    CALL PHYSICIAN FOR:  Obvious bleeding  Excessive swelling  Drainage (pus) from the wound  Pain unrelieved by pain medication.  If dressing gets wet

## 2022-04-11 NOTE — BRIEF OP NOTE
[facility] - Surgery ([facility])  Brief Operative Note    Surgery Date: 4/11/2022     Surgeon(s) and Role:     * Jacque Medina MD - Primary    Assisting Surgeon: None    Pre-op Diagnosis:  Amputation of finger tip [S68.119A]  Hand injury, right, initial encounter [S69.91XA]    Post-op Diagnosis:  Post-Op Diagnosis Codes:     * Amputation of finger tip [S68.119A]     * Hand injury, right, initial encounter [S69.91XA]    Procedure(s) (LRB):  FLAP GRAFT, left cross finger flap index finger (Left)  REPAIR, TENDON, HAND, left long/index extensor tendon (Left)  REPAIR, NERVE, FINGER, left long finger (Left)  SURGICAL PROCUREMENT, GRAFT, SKIN (Left)    Anesthesia: Local MAC    Operative Findings: see op note    Estimated Blood Loss: see op note         Specimens:   Specimen (24h ago, onward)            None            Discharge Note    OUTCOME: Patient tolerated treatment/procedure well without complication and is now ready for discharge.    DISPOSITION: Home or Self Care    FINAL DIAGNOSIS:  Hand injury, right, initial encounter    FOLLOWUP: In clinic    DISCHARGE INSTRUCTIONS:    Discharge Procedure Orders   Lifting restrictions     Leave dressing on - Keep it clean, dry, and intact until clinic visit     Notify your health care provider if you experience any of the following:  severe uncontrolled pain     Notify your health care provider if you experience any of the following:  persistent nausea and vomiting or diarrhea     Notify your health care provider if you experience any of the following:  temperature >100.4     Activity as tolerated

## 2022-04-11 NOTE — ANESTHESIA PREPROCEDURE EVALUATION
"                                                                                                             04/11/2022  Tony Soto is a 26 y.o., male.    Pre-operative evaluation for Procedure(s) (LRB):  FLAP GRAFT, left cross finger flap index finger (Left)  REPAIR, TENDON, HAND, left long/index extensor tendon (Left)  REPAIR, NERVE, FINGER, left long finger (Left)  REMOVAL, NAIL, DIGIT, left index (Left)  REPAIR, NAIL BED,  left index (Left)  AMPUTATION, FINGER, left index revision partial amputation (Left)        Patient Active Problem List   Diagnosis    Amputation of finger tip       Review of patient's allergies indicates:  No Known Allergies    No current facility-administered medications on file prior to encounter.     Current Outpatient Medications on File Prior to Encounter   Medication Sig Dispense Refill    sulfamethoxazole-trimethoprim 800-160mg (BACTRIM DS) 800-160 mg Tab Take 1 tablet by mouth 2 (two) times daily. for 14 days 28 tablet 0    ondansetron (ZOFRAN-ODT) 4 MG TbDL Take 1 tablet (4 mg total) by mouth every 6 (six) hours as needed. 15 tablet 0    pantoprazole (PROTONIX) 20 MG tablet Take 1 tablet (20 mg total) by mouth once daily. 30 tablet 0       History reviewed. No pertinent surgical history.    Diagnostic Studies:      Pre-op Vitals [04/11/22 1140]   BP Pulse Resp Temp SpO2   130/69 95 18 (!) 39.2 °C (102.5 °F) 98 %      Height Weight BMI (Calculated)     5' 6" 165 lb 26.6         Pre-op Vitals [04/11/22 1140]   BP Pulse Resp Temp SpO2   130/69 95 18 (!) 39.2 °C (102.5 °F) 98 %      Height Weight BMI (Calculated)     5' 6" 165 lb 26.6          Pre-op Assessment          Review of Systems      Physical Exam  General: Well nourished    Chest/Lungs:  Clear to auscultation, Normal Respiratory Rate    Heart:  Rate: Normal  Rhythm: Regular Rhythm        Anesthesia Plan  Type of Anesthesia, risks & benefits discussed:    Anesthesia Type: Gen Natural Airway, Regional  Intra-op Monitoring " Plan: Standard ASA Monitors  Post Op Pain Control Plan: multimodal analgesia and IV/PO Opioids PRN  Airway Plan: Direct  ASA Score: 1  Anesthesia Plan Notes: Patient started with a fever last night - elevated temp this am on arrival - unsure of etiology as he is otherwise asymptomatic except for headache - URI vs infection from hand injury.  Temp improved since tylenol and celebrex given - will continue to monitor.      Ready For Surgery From Anesthesia Perspective.     .

## 2022-04-11 NOTE — PLAN OF CARE
VSS. Patient able to tolerate oral liquids.  Patient denies c/o pain. Patient/family received home medication per bedside delivery. Dressing intact.  No distress noted. Patient states he is ready for discharge. Discharge instructions reviewed with patient and family, verbalized understanding. IV discontinued with catheter tip intact. Family at bedside to help patient dress. Patient wheeled to lobby via staff.

## 2022-04-11 NOTE — TELEPHONE ENCOUNTER
Spoke c pt. Informed him Dr. Sandra is ahead of schedule today. Asked that he arrive no lter than 1100. Pt stated he will arrive by that time.

## 2022-04-12 NOTE — ANESTHESIA POSTPROCEDURE EVALUATION
Anesthesia Post Evaluation    Patient: Tony Soto    Procedure(s) Performed: Procedure(s) (LRB):  FLAP GRAFT, left cross finger flap index finger (Left)  REPAIR, TENDON, HAND, left long/index extensor tendon (Left)  REPAIR, NERVE, FINGER, left long finger (Left)  SURGICAL PROCUREMENT, GRAFT, SKIN (Left)    Final Anesthesia Type: general      Patient location during evaluation: PACU  Patient participation: Yes- Able to Participate  Level of consciousness: awake and alert  Post-procedure vital signs: reviewed and stable  Pain management: adequate  Airway patency: patent    PONV status at discharge: No PONV  Anesthetic complications: no      Cardiovascular status: blood pressure returned to baseline  Respiratory status: unassisted  Hydration status: euvolemic  Follow-up not needed.          Vitals Value Taken Time   /67 04/11/22 1630   Temp 36.9 °C (98.5 °F) 04/11/22 1630   Pulse 75 04/11/22 1631   Resp 15 04/11/22 1630   SpO2 99 % 04/11/22 1631   Vitals shown include unvalidated device data.      Event Time   Out of Recovery 16:20:00         Pain/Raymond Score: Pain Rating Prior to Med Admin: 0 (4/11/2022 12:10 PM)  Raymond Score: 10 (4/11/2022  4:00 PM)

## 2022-04-13 ENCOUNTER — HOSPITAL ENCOUNTER (EMERGENCY)
Facility: HOSPITAL | Age: 27
Discharge: HOME | End: 2022-04-14
Attending: INTERNAL MEDICINE
Payer: COMMERCIAL

## 2022-04-13 ENCOUNTER — NURSE TRIAGE (OUTPATIENT)
Dept: ADMINISTRATIVE | Facility: CLINIC | Age: 27
End: 2022-04-13
Payer: COMMERCIAL

## 2022-04-13 DIAGNOSIS — B09 VIRAL EXANTHEM: ICD-10-CM

## 2022-04-13 DIAGNOSIS — R50.9 FEVER, UNSPECIFIED FEVER CAUSE: Primary | ICD-10-CM

## 2022-04-13 LAB
ALBUMIN SERPL-MCNC: 3.9 G/DL (ref 3.3–5.5)
ALLENS TEST: ABNORMAL
ALP SERPL-CCNC: 51 U/L (ref 42–141)
BILIRUB SERPL-MCNC: 0.5 MG/DL (ref 0.2–1.6)
BUN SERPL-MCNC: 13 MG/DL (ref 7–22)
CALCIUM SERPL-MCNC: 8.9 MG/DL (ref 8–10.3)
CHLORIDE SERPL-SCNC: 97 MMOL/L (ref 98–108)
CREAT SERPL-MCNC: 1.4 MG/DL (ref 0.6–1.2)
CTP QC/QA: YES
GLUCOSE SERPL-MCNC: 103 MG/DL (ref 73–118)
HCO3 UR-SCNC: 26.4 MMOL/L (ref 24–28)
INFLUENZA A ANTIGEN, POC: NEGATIVE
INFLUENZA B ANTIGEN, POC: NEGATIVE
LDH SERPL L TO P-CCNC: 1.16 MMOL/L (ref 0.5–2.2)
PCO2 BLDA: 42.5 MMHG (ref 35–45)
PH SMN: 7.4 [PH] (ref 7.35–7.45)
PO2 BLDA: 31 MMHG (ref 40–60)
POC ALT (SGPT): 29 U/L (ref 10–47)
POC AST (SGOT): 44 U/L (ref 11–38)
POC BE: 1 MMOL/L
POC SATURATED O2: 60 % (ref 95–100)
POC TCO2: 27 MMOL/L (ref 18–33)
POC TCO2: 28 MMOL/L (ref 24–29)
POTASSIUM BLD-SCNC: 3.6 MMOL/L (ref 3.6–5.1)
PROTEIN, POC: 7.7 G/DL (ref 6.4–8.1)
SAMPLE: ABNORMAL
SARS-COV-2 RDRP RESP QL NAA+PROBE: NEGATIVE
SITE: ABNORMAL
SODIUM BLD-SCNC: 138 MMOL/L (ref 128–145)

## 2022-04-13 PROCEDURE — 87040 BLOOD CULTURE FOR BACTERIA: CPT | Mod: 59 | Performed by: INTERNAL MEDICINE

## 2022-04-13 PROCEDURE — 87804 INFLUENZA ASSAY W/OPTIC: CPT | Mod: 59,ER

## 2022-04-13 PROCEDURE — U0002 COVID-19 LAB TEST NON-CDC: HCPCS | Mod: ER | Performed by: PHYSICIAN ASSISTANT

## 2022-04-13 PROCEDURE — 85025 COMPLETE CBC W/AUTO DIFF WBC: CPT | Mod: ER

## 2022-04-13 PROCEDURE — 99284 EMERGENCY DEPT VISIT MOD MDM: CPT | Mod: 25,ER

## 2022-04-13 PROCEDURE — 25000003 PHARM REV CODE 250: Mod: ER | Performed by: INTERNAL MEDICINE

## 2022-04-13 PROCEDURE — 80053 COMPREHEN METABOLIC PANEL: CPT | Mod: ER

## 2022-04-13 PROCEDURE — 82803 BLOOD GASES ANY COMBINATION: CPT | Mod: ER

## 2022-04-13 RX ORDER — IBUPROFEN 600 MG/1
600 TABLET ORAL
Status: COMPLETED | OUTPATIENT
Start: 2022-04-13 | End: 2022-04-13

## 2022-04-13 RX ADMIN — IBUPROFEN 600 MG: 600 TABLET ORAL at 09:04

## 2022-04-13 NOTE — OP NOTE
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 Soto is a 26-year-old male who sustained an injury to his left volar index finger as well as almost circumferential left long finger he was seen in the emergency room treated with irrigation debridement emergency room sutured his long finger however he was missing roughly 2.5 cm of his volar finger tip was referred to clinic for either revision amputation versus flap on examination I discussed the patient that we would perform cross-finger flap to maintain length of his index finger in addition he had numbness on the radial side of his long finger we do an exploration of his nerve possible extensor tendon repair risks and benefits were explained to the patient in clinic consents were signed in clinic  Procedure in detail the correct site was marked with the patient's participation in the holding area patient was brought to the operating room placed in supine position underwent MAC anesthesia well-padded nonsterile tourniquet was placed on left upper extremity left upper extremities prepped draped normal sterile fashion time-out was conducted for the correct procedure to be  indicated IV antibiotics given patient preoperatively of note this was done under local as well the arm was elevated tourniquet was insufflated 250 mmHg use the sutures from the ER were removed at this point exploration of the digital nerve was maintain under microscopic magnification the 2 ends of the digital nerve for evaluated the more proximal in was mangled was debrided back to a stable nerve because it was debrided back and we would need to maintain long finger in full extension because of the extensor tendon injury a nerve conduit was used the nerve was sutured into the nerve conduit using 8 0 nylon this was a good snug repair this areas irrigated copious amounts normal saline nylon closed the skin our attention was then turned to the extensor tendons Service and a dramatic desats was conducted because the extensor tendon was and poor condition she this would be to maintain extension portion of it was repaired with Vicryl suture again this areas irrigated copious amounts normal saline nylon closed the skin our attention was then turned to the index finger volar surface a cross-finger flap was performed in which the dorsum of the long finger was elevated it was sutured on the volar surface of the index finger tucked into position this was sutured with chromic stitch once good coverage was performed our attention was then turned back to the long finger the donor site would need skin graft and full-thickness skin graft measuring roughly 3 cm x 1.55 cm was harvested from the volar forearm the fat it and sutured into the defect the donor site from the skin graft was then closed with Vicryl Monocryl after it was irrigated copious amounts normal saline the tourniquet was then deflated brisk cap refill sued patient was placed in a well-padded splint tolerated suture was brought to cover area in stable condition    Postop plans patient keep the dressing clean dry intact will see him back in 2 weeks time we will  schedule him for separation of the 2 fingers we did discuss that over time nerve regeneration is not quick recovery patient understands the we will continue to educate the patient on recovery    Significant Surgical Tasks Conducted by the Assistant(s), if Applicable: retraction    Complications: No    Estimated Blood Loss (EBL): * No values recorded between 4/11/2022  2:03 PM and 4/11/2022  3:41 PM *           Implants:   Implant Name Type Inv. Item Serial No.  Lot No. LRB No. Used Action   [id number]  [employer]CTOR NRV AXOGUARD [id number] AXOGEN [id number] Left 1 Implanted       Specimens:   Specimen (24h ago, onward)            None                  Condition: Good    Disposition: PACU - hemodynamically stable.    Attestation: I performed the procedure.    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     ondansetron 4 MG Tbdl  Commonly known as: ZOFRAN-ODT  Take 1 tablet (4 mg total) by mouth every 6 (six) hours as needed.     pantoprazole 20 MG tablet  Commonly known as: PROTONIX  Take 1 tablet (20 mg total) by mouth once daily.     sulfamethoxazole-trimethoprim 800-160mg 800-160 mg Tab  Commonly known as: BACTRIM DS  Take 1 tablet by mouth 2 (two) times daily. for 14 days          Discharge Procedure Orders   Lifting restrictions      Leave dressing on - Keep it clean, dry, and intact until clinic visit     Notify your health care provider if you experience any of the following:  severe uncontrolled pain     Notify your health care provider if you experience any of the following:  persistent nausea and vomiting or diarrhea     Notify your health care provider if you experience any of the following:  temperature >100.4     Activity as tolerated      Follow-up Information     [facility] Follow up in 1 week(s).    Specialty: Orthopedics  Why: For wound re-check  Contact information:  [address], [address]  [facility] [address] [phone]  Additional information:  [facility], 9th Floor   Please park in [address] and use [place] elevators

## 2022-04-14 ENCOUNTER — TELEPHONE (OUTPATIENT)
Dept: ORTHOPEDICS | Facility: CLINIC | Age: 27
End: 2022-04-14
Payer: COMMERCIAL

## 2022-04-14 VITALS
BODY MASS INDEX: 26.52 KG/M2 | RESPIRATION RATE: 19 BRPM | HEART RATE: 79 BPM | DIASTOLIC BLOOD PRESSURE: 62 MMHG | HEIGHT: 66 IN | OXYGEN SATURATION: 99 % | TEMPERATURE: 99 F | WEIGHT: 165 LBS | SYSTOLIC BLOOD PRESSURE: 115 MMHG

## 2022-04-14 PROBLEM — R50.9 FEVER: Status: ACTIVE | Noted: 2022-04-14

## 2022-04-14 PROBLEM — B09 VIRAL EXANTHEM: Status: ACTIVE | Noted: 2022-04-14

## 2022-04-14 RX ORDER — IBUPROFEN 800 MG/1
800 TABLET ORAL EVERY 8 HOURS PRN
Qty: 30 TABLET | Refills: 0 | Status: SHIPPED | OUTPATIENT
Start: 2022-04-14 | End: 2022-09-16 | Stop reason: SDUPTHER

## 2022-04-14 NOTE — FIRST PROVIDER EVALUATION
Emergency Department TeleTriage Encounter Note      CHIEF COMPLAINT    Chief Complaint   Patient presents with    Post-op Problem     PT REPORTS HAD HAND SURGERY (CROSS FLAP) ON Monday AND DEVELOPED FEVER THAT NIGHT, LAST TYLENOL 30 MIN PTA       VITAL SIGNS   Initial Vitals [04/13/22 2109]   BP Pulse Resp Temp SpO2   118/72 100 18 (!) 101 °F (38.3 °C) 98 %      MAP       --            ALLERGIES    Review of patient's allergies indicates:  No Known Allergies    PROVIDER TRIAGE NOTE  26-year-old male to the ER for evaluation of a fever.  The patient is currently 2 days postop from surgery on his hand.  He states that he is doing well from a surgical perspective.  Prior to surgery he had a fever.  Fever had improved but then returned again today.      ORDERS  Labs Reviewed   POCT INFLUENZA A/B MOLECULAR   SARS-COV-2 RDRP GENE       ED Orders (720h ago, onward)    Start Ordered     Status Ordering Provider    04/13/22 2126 04/13/22 2125  POCT Influenza A/B Molecular  Once         Ordered JAMI MICHAUD    04/13/22 2126 04/13/22 2125  POCT COVID-19 Rapid Screening  Once         Ordered JAMI MICHAUD    04/13/22 2115 04/13/22 2111  ibuprofen tablet 600 mg  ED 1 Time         Last MAR action: Given - by RAKESH AGUILAR on 04/13/22 at 2112 SABRA DIALLO            Virtual Visit Note: The provider triage portion of this emergency department evaluation and documentation was performed via [employer], a HIPAA-compliant telemedicine application, in concert with a tele-presenter in the room. A face to face patient evaluation with one of my colleagues will occur once the patient is placed in an emergency department room.      DISCLAIMER: This note was prepared with [employer] voice recognition transcription software. Garbled syntax, mangled pronouns, and other bizarre constructions may be attributed to that software system.

## 2022-04-14 NOTE — ED PROVIDER NOTES
Encounter Date: 4/13/2022    SCRIBE #1 NOTE: I, Ravi Alejandro, am scribing for, and in the presence of,  Smith Balderas MD. I have scribed the following portions of the note - Other sections scribed: HPI, ROS, PE.       History     Chief Complaint   Patient presents with    Post-op Problem     PT REPORTS HAD HAND SURGERY (CROSS FLAP) ON Monday AND DEVELOPED FEVER THAT NIGHT, LAST TYLENOL 30 MIN PTA     Tony Soto 26 y.o. male, with no known pertinent PMHx, presents to the ED with fever that began 3 days ago with rash and lip swelling that began today. Patient reports his fever was 103 degrees at the highest. Patient states he is post-op from a surgery 2 days ago involving his left hand. Endorses use of Tylenol with some relief of fever. Patient denies rhinorrhea, cough, or other associated symptoms. No known alleviating or aggravating factors.      The history is provided by the patient. No  was used.     Review of patient's allergies indicates:  No Known Allergies  History reviewed. No pertinent past medical history.  Past Surgical History:   Procedure Laterality Date    FLAP GRAFT SURGERY Left 4/11/2022    Procedure: FLAP GRAFT, left cross finger flap index finger;  Surgeon: Jacque Medina MD;  Location: [facility] OR;  Service: Orthopedics;  Laterality: Left;    HARVESTING OF SKIN GRAFT Left 4/11/2022    Procedure: SURGICAL PROCUREMENT, GRAFT, SKIN;  Surgeon: Jacque Medina MD;  Location: [facility] OR;  Service: Orthopedics;  Laterality: Left;    HARVESTING OF SKIN GRAFT  4/11/2022    Procedure: ;  Surgeon: Jacque Medina MD;  Location: [facility] OR;  Service: Orthopedics;;    HARVESTING OF SKIN GRAFT  4/11/2022    Procedure: ;  Surgeon: Jacque Medina MD;  Location: [facility] OR;  Service: Orthopedics;;    REPAIR OF NERVE OF FINGER Left 4/11/2022    Procedure: REPAIR, NERVE, FINGER, left long finger;  Surgeon: Jacque Medina MD;  Location: [facility] OR;  Service: Orthopedics;   Laterality: Left;    TENOPLASTY OF HAND Left 4/11/2022    Procedure: REPAIR, TENDON, HAND, left long/index extensor tendon;  Surgeon: Jacque Medina MD;  Location: [facility];  Service: Orthopedics;  Laterality: Left;     No family history on file.  Social History     Tobacco Use    Smoking status: Former Smoker     Types: Vaping with nicotine    Smokeless tobacco: Never Used   Substance Use Topics    Alcohol use: Yes     Comment: ocassionally    Drug use: Never     Review of Systems   Constitutional: Positive for fever.   HENT: Positive for facial swelling. Negative for sore throat.    Respiratory: Negative for shortness of breath.    Cardiovascular: Negative for chest pain.   Gastrointestinal: Negative for diarrhea, nausea and vomiting.   Genitourinary: Negative for dysuria.   Musculoskeletal: Negative for back pain.   Skin: Positive for rash.   Neurological: Negative for weakness and headaches.   Psychiatric/Behavioral: Negative for behavioral problems.   All other systems reviewed and are negative.      Physical Exam     Initial Vitals [04/13/22 2109]   BP Pulse Resp Temp SpO2   118/72 100 18 (!) 101 °F (38.3 °C) 98 %      MAP       --         Physical Exam    Nursing note and vitals reviewed.  Constitutional: He appears well-developed and well-nourished.   HENT:   Head: Normocephalic and atraumatic.   Mouth/Throat: Posterior oropharyngeal erythema present. No oropharyngeal exudate or posterior oropharyngeal edema.   Eyes: Conjunctivae are normal.   Neck: Neck supple.   Normal range of motion.  Cardiovascular: Normal rate, regular rhythm and normal heart sounds. Exam reveals no gallop and no friction rub.    No murmur heard.  Pulmonary/Chest: Breath sounds normal. No respiratory distress. He has no wheezes. He has no rhonchi. He has no rales.   Abdominal: Abdomen is soft. There is no abdominal tenderness.   Musculoskeletal:         General: No edema. Normal range of motion.      Cervical back: Normal  range of motion and neck supple.      Comments: Left forearm splint and Ace wrap are in place with no evidence and erythema or streaking of the upper arm.     Neurological: He is alert and oriented to person, place, and time. GCS score is 15. GCS eye subscore is 4. GCS verbal subscore is 5. GCS motor subscore is 6.   Skin: Skin is warm and dry. Rash noted. Rash is macular.   Generalized erythematous maculopapular rash that is non-tender.   Psychiatric: He has a normal mood and affect.         ED Course   Procedures  Labs Reviewed   ISTAT PROCEDURE - Abnormal; Notable for the following components:       Result Value    POC PO2 31 (*)     POC SATURATED O2 60 (*)     All other components within normal limits   POCT CMP - Abnormal; Notable for the following components:    AST (SGOT), POC 44 (*)     POC Chloride 97 (*)     POC Creatinine 1.4 (*)     All other components within normal limits   CULTURE, BLOOD   CULTURE, BLOOD   SARS-COV-2 RDRP GENE    Narrative:     This test utilizes isothermal nucleic acid amplification   technology to detect the SARS-CoV-2 RdRp nucleic acid segment.   The analytical sensitivity (limit of detection) is 125 genome   equivalents/mL.   A POSITIVE result implies infection with the SARS-CoV-2 virus;   the patient is presumed to be contagious.     A NEGATIVE result means that SARS-CoV-2 nucleic acids are not   present above the limit of detection. A NEGATIVE result should be   treated as presumptive. It does not rule out the possibility of   COVID-19 and should not be the sole basis for treatment decisions.   If COVID-19 is strongly suspected based on clinical and exposure   history, re-testing using an alternate molecular assay should be   considered.   This test is only for use under the Food and Drug   Administration s Emergency Use Authorization (EUA).   Commercial kits are provided by [employer].   Performance characteristics of the EUA have been independently   verified by  [facility] Department of   Pathology and Laboratory Medicine.   _________________________________________________________________   The authorized Fact Sheet for Healthcare Providers and the authorized Fact   Sheet for Patients of the ID NOW COVID-19 are available on the FDA   website:     https://www.fda.gov/media/348158/download  https://www.fda.gov/media/351126/download          POCT CBC   POCT INFLUENZA A/B MOLECULAR   POCT CMP   POCT RAPID INFLUENZA A/B   POCT RAPID INFLUENZA A/B              Imaging Results    None          Medications   ibuprofen tablet 600 mg (600 mg Oral Given 4/13/22 2112)     Medical Decision Making:   Initial Assessment:   Tony Soto 26 y.o. male, with no known pertinent PMHx, presents to the ED with fever that began 3 days ago with rash and lip swelling that began today. Patient reports his fever was 103 degrees at the highest. Patient states he is post-op from a surgery 2 days ago involving his left hand. Endorses use of Tylenol with some relief of fever. Patient denies rhinorrhea, cough, or other associated symptoms. No known alleviating or aggravating factors.  Clinical Tests:   Lab Tests: Ordered and Reviewed  ED Management:  White blood cell count was within normal limits, serum lactate was within normal limits, rapid flu was negative wound, COVID was negative and  patient's fever resolved after being given ibuprofen.  He was given instructions for viral exanthem/viral syndrome and received a prescription for ibuprofen.  He was advised to follow-up with primary care physician within the next week for re-evaluation/return to the emergency department if condition worsens.              Scribe Attestation:   Scribe #1: I performed the above scribed service and the documentation accurately describes the services I performed. I attest to the accuracy of the note.               This document was produced by a scribe under my direction and in my presence. I agree with the  content of the note and have made any necessary edits.     Dr. Balderas    04/14/2022 12:57 AM    Clinical Impression:   Final diagnoses:  [R50.9] Fever, unspecified fever cause (Primary)  [B09] Viral exanthem          ED Disposition Condition    Discharge Stable        ED Prescriptions     Medication Sig Dispense Start Date End Date Auth. Provider    ibuprofen (ADVIL,MOTRIN) 800 MG tablet Take 1 tablet (800 mg total) by mouth every 8 (eight) hours as needed for Pain. 30 tablet 4/14/2022  Smith Balderas MD        Follow-up Information     Follow up With Specialties Details Why Contact Info    Mook Jenkins MD Pediatrics Schedule an appointment as soon as possible for a visit in 1 day For reevaluation [address] [place] [id number]  [phone]             Smith Balderas MD  04/14/22 0058       Smith Balderas MD  04/14/22 0059

## 2022-04-14 NOTE — TELEPHONE ENCOUNTER
Attempted to contact pt. Unable to leave voicemail as mailbox is not set up.     ==  Spoke c pt's E[facility]. Informed her that pt needs to f/u c PCP due to continued fever that was present prior to surgery. Confirmed that ED also recommended f/u c PCP. Confirmed 1w PO appt 04/18/22 to discuss 2nd stage of surgery. Pt/Pt's EC will call c additional questions/concerns in interim. Pt's EC expressed understanding & was thankful.

## 2022-04-14 NOTE — TELEPHONE ENCOUNTER
Patient Calls    SOL Blank Staff 11 hours ago (8:48 PM)     AE    FYI. Please see OOC triage note and follow up w/ pt.   Thank you,   Saba Hunter RN, Nurse On Call    Routing comment      Saba Hunter RN 11 hours ago (8:37 PM)     AE       101.5 temp, post op. Advised, per protocol. Verbalizes understanding.      Reason for Disposition   Fever > 100.4 F (38.0 C)    Additional Information   Negative: Sounds like a life-threatening emergency to the triager   Negative: [1] Widespread rash AND [2] bright red, sunburn-like    Protocols used: POST-OP SYMPTOMS AND [id number]

## 2022-04-14 NOTE — TELEPHONE ENCOUNTER
101.5 temp, post op. Advised, per protocol. Verbalizes understanding.     Reason for Disposition   Fever > 100.4 F (38.0 C)    Additional Information   Negative: Sounds like a life-threatening emergency to the triager   Negative: [1] Widespread rash AND [2] bright red, sunburn-like    Protocols used: POST-OP SYMPTOMS AND [id number]

## 2022-04-18 ENCOUNTER — PATIENT MESSAGE (OUTPATIENT)
Dept: ORTHOPEDICS | Facility: CLINIC | Age: 27
End: 2022-04-18

## 2022-04-18 ENCOUNTER — OFFICE VISIT (OUTPATIENT)
Dept: ORTHOPEDICS | Facility: CLINIC | Age: 27
End: 2022-04-18
Payer: COMMERCIAL

## 2022-04-18 VITALS — BODY MASS INDEX: 26.52 KG/M2 | WEIGHT: 165 LBS | HEIGHT: 66 IN

## 2022-04-18 DIAGNOSIS — Z98.890 POST-OPERATIVE STATE: Primary | ICD-10-CM

## 2022-04-18 LAB
BACTERIA BLD CULT: NORMAL
BACTERIA BLD CULT: NORMAL

## 2022-04-18 PROCEDURE — 29125 PR APPLY FOREARM SPLINT,STATIC: ICD-10-PCS | Mod: S$GLB,,, | Performed by: PHYSICIAN ASSISTANT

## 2022-04-18 PROCEDURE — 3008F BODY MASS INDEX DOCD: CPT | Mod: CPTII,S$GLB,, | Performed by: PHYSICIAN ASSISTANT

## 2022-04-18 PROCEDURE — 1159F MED LIST DOCD IN RCRD: CPT | Mod: CPTII,S$GLB,, | Performed by: PHYSICIAN ASSISTANT

## 2022-04-18 PROCEDURE — 3008F PR BODY MASS INDEX (BMI) DOCUMENTED: ICD-10-PCS | Mod: CPTII,S$GLB,, | Performed by: PHYSICIAN ASSISTANT

## 2022-04-18 PROCEDURE — 99024 POSTOP FOLLOW-UP VISIT: CPT | Mod: S$GLB,,, | Performed by: PHYSICIAN ASSISTANT

## 2022-04-18 PROCEDURE — 99024 PR POST-OP FOLLOW-UP VISIT: ICD-10-PCS | Mod: S$GLB,,, | Performed by: PHYSICIAN ASSISTANT

## 2022-04-18 PROCEDURE — 1159F PR MEDICATION LIST DOCUMENTED IN MEDICAL RECORD: ICD-10-PCS | Mod: CPTII,S$GLB,, | Performed by: PHYSICIAN ASSISTANT

## 2022-04-18 PROCEDURE — 29125 APPL SHORT ARM SPLINT STATIC: CPT | Mod: S$GLB,,, | Performed by: PHYSICIAN ASSISTANT

## 2022-04-18 PROCEDURE — 99999 PR PBB SHADOW E&M-EST. PATIENT-LVL III: ICD-10-PCS | Mod: PBBFAC,,, | Performed by: PHYSICIAN ASSISTANT

## 2022-04-18 PROCEDURE — 99999 PR PBB SHADOW E&M-EST. PATIENT-LVL III: CPT | Mod: PBBFAC,,, | Performed by: PHYSICIAN ASSISTANT

## 2022-04-18 NOTE — PROGRESS NOTES
"Mr. Soto is here today for a post-operative visit.  He is 7 days status post left long digital nerve repair with nerve conduit, left long extensor tendon repair, and cross finger flap long to index by Dr. Medina on 4/11/21. He reports that he is doing well. Pain is minimal he is not taking pain medication. He denies fever, chills, and sweats since the time of the surgery.     Physical exam:    Vitals:    04/18/22 0845   Weight: 74.8 kg (165 lb)   Height: 5' 6" (1.676 m)   PainSc:   4     Vital signs are stable, patient is afebrile.  Patient is well dressed and well groomed, no acute distress.  Alert and oriented to person, place, and time.  Post op dressing taken down.  Incision is clean, dry and intact.  There is no erythema or exudate.  There is no sign of any infection. He is NVI.     Assessment:  status post left long digital nerve repair with nerve conduit, left long extensor tendon repair, and cross finger flap long to index by Dr. Medina on 4/11/21    Plan:  Tony was seen today for post-op evaluation.    Diagnoses and all orders for this visit:    Post-operative state      PO instruction reviewed and provided to patient  New volar slab splint applied to include digits 2-5   Consents reviewed and signed in clinic for future left index and long finger separation pt scheduled for surgery 5/2/22  RTC 1 wk         "

## 2022-04-20 DIAGNOSIS — S68.119S AMPUTATION OF TIP OF FINGER, SEQUELA: Primary | ICD-10-CM

## 2022-04-25 ENCOUNTER — ANESTHESIA EVENT (OUTPATIENT)
Dept: SURGERY | Facility: HOSPITAL | Age: 27
End: 2022-04-25
Payer: COMMERCIAL

## 2022-04-25 NOTE — ANESTHESIA PAT ROS NOTE
Chart review complete. Patient's medical history reviewed.  OK to proceed at [facility].  Julia Maldonado MD

## 2022-04-28 ENCOUNTER — OFFICE VISIT (OUTPATIENT)
Dept: ORTHOPEDICS | Facility: CLINIC | Age: 27
End: 2022-04-28
Payer: COMMERCIAL

## 2022-04-28 VITALS
SYSTOLIC BLOOD PRESSURE: 102 MMHG | HEART RATE: 64 BPM | DIASTOLIC BLOOD PRESSURE: 68 MMHG | HEIGHT: 66 IN | WEIGHT: 165 LBS | BODY MASS INDEX: 26.52 KG/M2

## 2022-04-28 DIAGNOSIS — Z47.89 ORTHOPEDIC AFTERCARE: ICD-10-CM

## 2022-04-28 DIAGNOSIS — S68.119A AMPUTATION OF TIP OF FINGER, INITIAL ENCOUNTER: Primary | ICD-10-CM

## 2022-04-28 PROCEDURE — 1159F PR MEDICATION LIST DOCUMENTED IN MEDICAL RECORD: ICD-10-PCS | Mod: CPTII,S$GLB,, | Performed by: PHYSICIAN ASSISTANT

## 2022-04-28 PROCEDURE — 29125 APPL SHORT ARM SPLINT STATIC: CPT | Mod: 58,LT,S$GLB, | Performed by: PHYSICIAN ASSISTANT

## 2022-04-28 PROCEDURE — 3008F PR BODY MASS INDEX (BMI) DOCUMENTED: ICD-10-PCS | Mod: CPTII,S$GLB,, | Performed by: PHYSICIAN ASSISTANT

## 2022-04-28 PROCEDURE — 3078F DIAST BP <80 MM HG: CPT | Mod: CPTII,S$GLB,, | Performed by: PHYSICIAN ASSISTANT

## 2022-04-28 PROCEDURE — 99999 PR PBB SHADOW E&M-EST. PATIENT-LVL III: CPT | Mod: PBBFAC,,, | Performed by: PHYSICIAN ASSISTANT

## 2022-04-28 PROCEDURE — 29125 PR APPLY FOREARM SPLINT,STATIC: ICD-10-PCS | Mod: 58,LT,S$GLB, | Performed by: PHYSICIAN ASSISTANT

## 2022-04-28 PROCEDURE — 99024 PR POST-OP FOLLOW-UP VISIT: ICD-10-PCS | Mod: S$GLB,,, | Performed by: PHYSICIAN ASSISTANT

## 2022-04-28 PROCEDURE — 99024 POSTOP FOLLOW-UP VISIT: CPT | Mod: S$GLB,,, | Performed by: PHYSICIAN ASSISTANT

## 2022-04-28 PROCEDURE — 3008F BODY MASS INDEX DOCD: CPT | Mod: CPTII,S$GLB,, | Performed by: PHYSICIAN ASSISTANT

## 2022-04-28 PROCEDURE — 3074F PR MOST RECENT SYSTOLIC BLOOD PRESSURE < 130 MM HG: ICD-10-PCS | Mod: CPTII,S$GLB,, | Performed by: PHYSICIAN ASSISTANT

## 2022-04-28 PROCEDURE — 3078F PR MOST RECENT DIASTOLIC BLOOD PRESSURE < 80 MM HG: ICD-10-PCS | Mod: CPTII,S$GLB,, | Performed by: PHYSICIAN ASSISTANT

## 2022-04-28 PROCEDURE — 1160F PR REVIEW ALL MEDS BY PRESCRIBER/CLIN PHARMACIST DOCUMENTED: ICD-10-PCS | Mod: CPTII,S$GLB,, | Performed by: PHYSICIAN ASSISTANT

## 2022-04-28 PROCEDURE — 1160F RVW MEDS BY RX/DR IN RCRD: CPT | Mod: CPTII,S$GLB,, | Performed by: PHYSICIAN ASSISTANT

## 2022-04-28 PROCEDURE — 3074F SYST BP LT 130 MM HG: CPT | Mod: CPTII,S$GLB,, | Performed by: PHYSICIAN ASSISTANT

## 2022-04-28 PROCEDURE — 1159F MED LIST DOCD IN RCRD: CPT | Mod: CPTII,S$GLB,, | Performed by: PHYSICIAN ASSISTANT

## 2022-04-28 PROCEDURE — 99999 PR PBB SHADOW E&M-EST. PATIENT-LVL III: ICD-10-PCS | Mod: PBBFAC,,, | Performed by: PHYSICIAN ASSISTANT

## 2022-04-28 NOTE — PROGRESS NOTES
"Mr. Soto is here today for a post-operative visit.  He is 17 days status post left index digital nerve repair with nerve conduit, left index extensor tendon repair, and cross finger flap long to index by Dr. Medina on 4/11/21. He reports that he is doing well. Pain is minimal he is not taking pain medication. He denies fever, chills, and sweats since the time of the surgery.     Physical exam:    Vitals:    04/28/22 1011   BP: 102/68   Pulse: 64   Weight: 74.8 kg (165 lb)   Height: 5' 6" (1.676 m)   PainSc:   2     Vital signs are stable, patient is afebrile.  Patient is well dressed and well groomed, no acute distress.  Alert and oriented to person, place, and time.  Splint and dressing taken down.  Incisions and skin flaps clean and intact, mild maceration over the dorsal flap.  There is no erythema or exudate.  There is no sign of any infection. He is NVI.     Assessment: 17 days status post left index digital nerve repair with nerve conduit, left index extensor tendon repair, and cross finger flap long to index     Plan:  Tony was seen today for post-op evaluation.    Diagnoses and all orders for this visit:    Amputation of tip of finger, initial encounter    Orthopedic aftercare        - gutter plaster well-padded splint applied to include digits 2-5, fingers in some flexion   - left index and long finger separation surgery 5/2/22  - follow up sooner if needed, call with questions or concerns           "

## 2022-04-29 ENCOUNTER — TELEPHONE (OUTPATIENT)
Dept: ORTHOPEDICS | Facility: CLINIC | Age: 27
End: 2022-04-29
Payer: COMMERCIAL

## 2022-04-29 DIAGNOSIS — Z98.890 POST-OPERATIVE STATE: Primary | ICD-10-CM

## 2022-04-29 RX ORDER — TRAMADOL HYDROCHLORIDE 50 MG/1
50 TABLET ORAL EVERY 6 HOURS PRN
Qty: 20 TABLET | Refills: 0 | Status: SHIPPED | OUTPATIENT
Start: 2022-04-29 | End: 2022-09-27

## 2022-04-29 NOTE — TELEPHONE ENCOUNTER
Spoke c pt. Informed pt of 7:45 a.m. arrival time for 05/02/22 surgery at the [facility]. Reminded pt of NPO status & PO appt. Pt expressed understanding & was thankful.

## 2022-04-30 DIAGNOSIS — S61.219A FINGER LACERATION: ICD-10-CM

## 2022-04-30 RX ORDER — MUPIROCIN 20 MG/G
OINTMENT TOPICAL
Status: CANCELLED | OUTPATIENT
Start: 2022-04-30

## 2022-05-02 ENCOUNTER — HOSPITAL ENCOUNTER (OUTPATIENT)
Facility: HOSPITAL | Age: 27
Discharge: HOME | End: 2022-05-02
Attending: ORTHOPAEDIC SURGERY | Admitting: ORTHOPAEDIC SURGERY
Payer: COMMERCIAL

## 2022-05-02 ENCOUNTER — ANESTHESIA (OUTPATIENT)
Dept: SURGERY | Facility: HOSPITAL | Age: 27
End: 2022-05-02
Payer: COMMERCIAL

## 2022-05-02 VITALS
RESPIRATION RATE: 16 BRPM | HEIGHT: 66 IN | BODY MASS INDEX: 26.52 KG/M2 | DIASTOLIC BLOOD PRESSURE: 62 MMHG | WEIGHT: 165 LBS | SYSTOLIC BLOOD PRESSURE: 150 MMHG | OXYGEN SATURATION: 100 % | HEART RATE: 58 BPM | TEMPERATURE: 98 F

## 2022-05-02 DIAGNOSIS — S61.219A FINGER LACERATION: ICD-10-CM

## 2022-05-02 DIAGNOSIS — S69.91XA HAND INJURY, RIGHT, INITIAL ENCOUNTER: Primary | ICD-10-CM

## 2022-05-02 PROCEDURE — 37000008 HC ANESTHESIA 1ST 15 MINUTES: Performed by: ORTHOPAEDIC SURGERY

## 2022-05-02 PROCEDURE — 99900035 HC TECH TIME PER 15 MIN (STAT)

## 2022-05-02 PROCEDURE — 71000033 HC RECOVERY, INTIAL HOUR: Performed by: ORTHOPAEDIC SURGERY

## 2022-05-02 PROCEDURE — 25000003 PHARM REV CODE 250: Performed by: SURGERY

## 2022-05-02 PROCEDURE — 94761 N-INVAS EAR/PLS OXIMETRY MLT: CPT

## 2022-05-02 PROCEDURE — D9220A PRA ANESTHESIA: ICD-10-PCS | Mod: CRNA,,, | Performed by: NURSE ANESTHETIST, CERTIFIED REGISTERED

## 2022-05-02 PROCEDURE — 25000003 PHARM REV CODE 250: Performed by: STUDENT IN AN ORGANIZED HEALTH CARE EDUCATION/TRAINING PROGRAM

## 2022-05-02 PROCEDURE — D9220A PRA ANESTHESIA: Mod: ANES,,, | Performed by: ANESTHESIOLOGY

## 2022-05-02 PROCEDURE — 71000015 HC POSTOP RECOV 1ST HR: Performed by: ORTHOPAEDIC SURGERY

## 2022-05-02 PROCEDURE — 25000003 PHARM REV CODE 250: Performed by: NURSE ANESTHETIST, CERTIFIED REGISTERED

## 2022-05-02 PROCEDURE — D9220A PRA ANESTHESIA: Mod: CRNA,,, | Performed by: NURSE ANESTHETIST, CERTIFIED REGISTERED

## 2022-05-02 PROCEDURE — 15620 PR DELAY/SECTN FLAP FACE,GENIT,HAND,FT: ICD-10-PCS | Mod: 58,LT,, | Performed by: ORTHOPAEDIC SURGERY

## 2022-05-02 PROCEDURE — 36000706: Performed by: ORTHOPAEDIC SURGERY

## 2022-05-02 PROCEDURE — D9220A PRA ANESTHESIA: ICD-10-PCS | Mod: ANES,,, | Performed by: ANESTHESIOLOGY

## 2022-05-02 PROCEDURE — 15620 DELAY FLAP F/C/C/N/AX/G/H/F: CPT | Mod: 58,LT,, | Performed by: ORTHOPAEDIC SURGERY

## 2022-05-02 PROCEDURE — 36000707: Performed by: ORTHOPAEDIC SURGERY

## 2022-05-02 PROCEDURE — 63600175 PHARM REV CODE 636 W HCPCS: Performed by: STUDENT IN AN ORGANIZED HEALTH CARE EDUCATION/TRAINING PROGRAM

## 2022-05-02 PROCEDURE — 37000009 HC ANESTHESIA EA ADD 15 MINS: Performed by: ORTHOPAEDIC SURGERY

## 2022-05-02 PROCEDURE — 25000003 PHARM REV CODE 250: Performed by: ORTHOPAEDIC SURGERY

## 2022-05-02 PROCEDURE — 63600175 PHARM REV CODE 636 W HCPCS: Performed by: NURSE ANESTHETIST, CERTIFIED REGISTERED

## 2022-05-02 RX ORDER — OXYCODONE HYDROCHLORIDE 5 MG/1
5 TABLET ORAL
Status: DISCONTINUED | OUTPATIENT
Start: 2022-05-02 | End: 2022-05-02 | Stop reason: HOSPADM

## 2022-05-02 RX ORDER — LIDOCAINE HYDROCHLORIDE 20 MG/ML
INJECTION INTRAVENOUS
Status: DISCONTINUED | OUTPATIENT
Start: 2022-05-02 | End: 2022-05-02

## 2022-05-02 RX ORDER — HYDROMORPHONE HYDROCHLORIDE 1 MG/ML
0.2 INJECTION, SOLUTION INTRAMUSCULAR; INTRAVENOUS; SUBCUTANEOUS EVERY 5 MIN PRN
Status: DISCONTINUED | OUTPATIENT
Start: 2022-05-02 | End: 2022-05-02 | Stop reason: HOSPADM

## 2022-05-02 RX ORDER — CELECOXIB 200 MG/1
400 CAPSULE ORAL ONCE
Status: COMPLETED | OUTPATIENT
Start: 2022-05-02 | End: 2022-05-02

## 2022-05-02 RX ORDER — HYDROCODONE BITARTRATE AND ACETAMINOPHEN 5; 325 MG/1; MG/1
1 TABLET ORAL EVERY 4 HOURS PRN
Status: DISCONTINUED | OUTPATIENT
Start: 2022-05-02 | End: 2022-05-02 | Stop reason: HOSPADM

## 2022-05-02 RX ORDER — ONDANSETRON 2 MG/ML
INJECTION INTRAMUSCULAR; INTRAVENOUS
Status: DISCONTINUED | OUTPATIENT
Start: 2022-05-02 | End: 2022-05-02

## 2022-05-02 RX ORDER — MUPIROCIN 20 MG/G
OINTMENT TOPICAL
Status: DISCONTINUED | OUTPATIENT
Start: 2022-05-02 | End: 2022-05-02 | Stop reason: HOSPADM

## 2022-05-02 RX ORDER — BACITRACIN ZINC 500 UNIT/G
OINTMENT (GRAM) TOPICAL
Status: DISCONTINUED | OUTPATIENT
Start: 2022-05-02 | End: 2022-05-02 | Stop reason: HOSPADM

## 2022-05-02 RX ORDER — ACETAMINOPHEN 500 MG
1000 TABLET ORAL
Status: COMPLETED | OUTPATIENT
Start: 2022-05-02 | End: 2022-05-02

## 2022-05-02 RX ORDER — ONDANSETRON 2 MG/ML
4 INJECTION INTRAMUSCULAR; INTRAVENOUS DAILY PRN
Status: DISCONTINUED | OUTPATIENT
Start: 2022-05-02 | End: 2022-05-02 | Stop reason: HOSPADM

## 2022-05-02 RX ORDER — LIDOCAINE HYDROCHLORIDE 10 MG/ML
1 INJECTION, SOLUTION EPIDURAL; INFILTRATION; INTRACAUDAL; PERINEURAL ONCE
Status: ACTIVE | OUTPATIENT
Start: 2022-05-02

## 2022-05-02 RX ORDER — LIDOCAINE HYDROCHLORIDE 10 MG/ML
INJECTION, SOLUTION EPIDURAL; INFILTRATION; INTRACAUDAL; PERINEURAL
Status: DISCONTINUED | OUTPATIENT
Start: 2022-05-02 | End: 2022-05-02 | Stop reason: HOSPADM

## 2022-05-02 RX ORDER — MIDAZOLAM HYDROCHLORIDE 1 MG/ML
INJECTION, SOLUTION INTRAMUSCULAR; INTRAVENOUS
Status: DISCONTINUED | OUTPATIENT
Start: 2022-05-02 | End: 2022-05-02

## 2022-05-02 RX ORDER — PROPOFOL 10 MG/ML
VIAL (ML) INTRAVENOUS CONTINUOUS PRN
Status: DISCONTINUED | OUTPATIENT
Start: 2022-05-02 | End: 2022-05-02

## 2022-05-02 RX ORDER — PROPOFOL 10 MG/ML
VIAL (ML) INTRAVENOUS
Status: DISCONTINUED | OUTPATIENT
Start: 2022-05-02 | End: 2022-05-02

## 2022-05-02 RX ORDER — SODIUM CHLORIDE 0.9 % (FLUSH) 0.9 %
10 SYRINGE (ML) INJECTION
Status: DISCONTINUED | OUTPATIENT
Start: 2022-05-02 | End: 2022-05-02 | Stop reason: HOSPADM

## 2022-05-02 RX ORDER — BUPIVACAINE HYDROCHLORIDE 2.5 MG/ML
INJECTION, SOLUTION EPIDURAL; INFILTRATION; INTRACAUDAL
Status: DISCONTINUED | OUTPATIENT
Start: 2022-05-02 | End: 2022-05-02 | Stop reason: HOSPADM

## 2022-05-02 RX ORDER — CEFAZOLIN SODIUM/WATER 2 G/20 ML
2 SYRINGE (ML) INTRAVENOUS
Status: COMPLETED | OUTPATIENT
Start: 2022-05-02 | End: 2022-05-02

## 2022-05-02 RX ORDER — FENTANYL CITRATE 50 UG/ML
INJECTION, SOLUTION INTRAMUSCULAR; INTRAVENOUS
Status: DISCONTINUED | OUTPATIENT
Start: 2022-05-02 | End: 2022-05-02

## 2022-05-02 RX ADMIN — ACETAMINOPHEN 1000 MG: 500 TABLET ORAL at 08:05

## 2022-05-02 RX ADMIN — ONDANSETRON 4 MG: 2 INJECTION, SOLUTION INTRAMUSCULAR; INTRAVENOUS at 10:05

## 2022-05-02 RX ADMIN — MIDAZOLAM HYDROCHLORIDE 2 MG: 1 INJECTION, SOLUTION INTRAMUSCULAR; INTRAVENOUS at 09:05

## 2022-05-02 RX ADMIN — CELECOXIB 400 MG: 200 CAPSULE ORAL at 08:05

## 2022-05-02 RX ADMIN — SODIUM CHLORIDE: 9 INJECTION, SOLUTION INTRAVENOUS at 09:05

## 2022-05-02 RX ADMIN — MUPIROCIN: 20 OINTMENT TOPICAL at 08:05

## 2022-05-02 RX ADMIN — FENTANYL CITRATE 50 MCG: 50 INJECTION, SOLUTION INTRAMUSCULAR; INTRAVENOUS at 10:05

## 2022-05-02 RX ADMIN — PROPOFOL 70 MG: 10 INJECTION, EMULSION INTRAVENOUS at 09:05

## 2022-05-02 RX ADMIN — LIDOCAINE HYDROCHLORIDE 75 MG: 20 INJECTION, SOLUTION INTRAVENOUS at 09:05

## 2022-05-02 RX ADMIN — Medication 2 G: at 10:05

## 2022-05-02 RX ADMIN — PROPOFOL 150 MCG/KG/MIN: 10 INJECTION, EMULSION INTRAVENOUS at 09:05

## 2022-05-02 NOTE — BRIEF OP NOTE
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]    Procedure(s) (LRB):  FLAP GRAFT,LEFT INDEX/LONG CROSS FINGER SEPARATION (Left)    Anesthesia: Local MAC    Description of the findings of the procedure: see operative note, cross finger flap separation    Findings/Key Components: See operative report    Estimated Blood Loss: * No values recorded between 5/2/2022 10:19 AM and 5/2/2022 10:55 AM *         Specimens:   Specimen (24h ago, onward)            None          Implants: * No implants in log *    Complications: None    Discharge Note    SUMMARY     Admit Date: 5/2/2022    Discharge Date and Time:  05/02/2022 10:55 AM    Hospital Course: Patient was placed in observation status for the above procedure.  See above.  Postoperatively was discharged home from the PACU once criteria was met.    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     ibuprofen 800 MG tablet  Commonly known as: ADVIL,MOTRIN  Take 1 tablet (800 mg total) by mouth every 8 (eight) hours as needed for Pain.     ondansetron 4 MG Tbdl  Commonly known as: ZOFRAN-ODT  Take 1 tablet (4 mg total) by mouth every 6 (six) hours as needed.     pantoprazole 20 MG tablet  Commonly known as: PROTONIX  Take 1 tablet (20 mg total) by mouth once daily.     traMADoL 50 mg tablet  Commonly known as:  ULTRAM  Take 1 tablet (50 mg total) by mouth every 6 (six) hours as needed for Pain.          Discharge Procedure Orders   Ambulatory referral/consult to Physical/Occupational Therapy   Standing Status: Future   Referral Priority: Routine Referral Type: Physical Medicine   Referral Reason: Specialty Services Required   Requested Specialty: Occupational Therapy   Number of Visits Requested: 1     Diet general     Call MD for:  severe uncontrolled pain     Call MD for:  redness, tenderness, or signs of infection (pain, swelling, redness, odor or green/yellow discharge around incision site)     Leave dressing on - Keep it clean, dry, and intact until clinic visit     Lifting restrictions   Order Comments: Light use of hand, keep clean and dry, until follow up.      Follow-up Information     Jacque Medina MD Follow up in 2 week(s).    Specialties: Hand Surgery, Orthopedic Surgery  Why: For suture removal, For wound re-check  Contact information:  [address]  [address]  [facility]  [facility] [address]  [phone]

## 2022-05-02 NOTE — TRANSFER OF CARE
"Anesthesia Transfer of Care Note    Patient: Tony Soto    Procedure(s) Performed: Procedure(s) (LRB):  FLAP GRAFT,LEFT INDEX/LONG CROSS FINGER SEPARATION (Left)    Patient location: PACU    Anesthesia Type: general    Transport from OR: Transported from OR on room air with adequate spontaneous ventilation    Post pain: adequate analgesia    Post assessment: no apparent anesthetic complications and tolerated procedure well    Post vital signs: stable    Level of consciousness: awake, alert and oriented    Nausea/Vomiting: no nausea/vomiting    Complications: none    Transfer of care protocol was followed      Last vitals:   Visit Vitals  /69 (BP Location: Right arm, Patient Position: Lying)   Pulse 69   Temp 36.6 °C (97.9 °F) (Oral)   Resp 16   Ht 5' 6" (1.676 m)   Wt 74.8 kg (165 lb)   SpO2 100%   BMI 26.63 kg/m²     "

## 2022-05-02 NOTE — PLAN OF CARE
Pre op complete. Significant other at bedside and will keep all belongings. Call light within reach. Anesthesia consent and site chris needed.

## 2022-05-02 NOTE — ANESTHESIA PREPROCEDURE EVALUATION
05/02/2022  Tony Soto is a 26 y.o., male.      Pre-op Assessment    I have reviewed the Patient Summary Reports.     I have reviewed the Nursing Notes.    I have reviewed the Medications.     Review of Systems  Anesthesia Hx:  No problems with previous Anesthesia  Denies Family Hx of Anesthesia complications.    Cardiovascular:  Cardiovascular Normal Exercise tolerance: good     Pulmonary:  Pulmonary Normal    Neurological:  Neurology Normal    History reviewed. No pertinent past medical history.  Past Surgical History:   Procedure Laterality Date    FLAP GRAFT SURGERY Left 4/11/2022    Procedure: FLAP GRAFT, left cross finger flap index finger;  Surgeon: Jacque Medina MD;  Location: [facility] OR;  Service: Orthopedics;  Laterality: Left;    HARVESTING OF SKIN GRAFT Left 4/11/2022    Procedure: SURGICAL PROCUREMENT, GRAFT, SKIN;  Surgeon: Jacque Medina MD;  Location: [facility] OR;  Service: Orthopedics;  Laterality: Left;    HARVESTING OF SKIN GRAFT  4/11/2022    Procedure: ;  Surgeon: Jacque Medina MD;  Location: [facility] OR;  Service: Orthopedics;;    HARVESTING OF SKIN GRAFT  4/11/2022    Procedure: ;  Surgeon: Jacque Medina MD;  Location: [facility] OR;  Service: Orthopedics;;    REPAIR OF NERVE OF FINGER Left 4/11/2022    Procedure: REPAIR, NERVE, FINGER, left long finger;  Surgeon: Jacque Medina MD;  Location: [facility] OR;  Service: Orthopedics;  Laterality: Left;    TENOPLASTY OF HAND Left 4/11/2022    Procedure: REPAIR, TENDON, HAND, left long/index extensor tendon;  Surgeon: Jacque Medina MD;  Location: [facility] OR;  Service: Orthopedics;  Laterality: Left;     Patient Active Problem List   Diagnosis    Amputation of finger tip    Hand injury, right, initial encounter    Viral exanthem    Fever         Physical Exam  General: Well nourished, Cooperative, Alert and  Oriented    Airway:  Mallampati: II   Mouth Opening: Normal  TM Distance: Normal  Tongue: Normal  Neck ROM: Normal ROM    Chest/Lungs:  Normal Respiratory Rate    Heart:  Rhythm: Regular Rhythm      Wt Readings from Last 3 Encounters:   05/02/22 74.8 kg (165 lb)   04/28/22 74.8 kg (165 lb)   04/18/22 74.8 kg (165 lb)     Temp Readings from Last 3 Encounters:   05/02/22 36.6 °C (97.9 °F) (Oral)   04/13/22 37.1 °C (98.8 °F) (Oral)   04/11/22 36.9 °C (98.5 °F) (Temporal)     BP Readings from Last 3 Encounters:   05/02/22 116/69   04/28/22 102/68   04/14/22 115/62     Pulse Readings from Last 3 Encounters:   05/02/22 69   04/28/22 64   04/14/22 79     No results found for: WBC, HGB, HCT, MCV, PLT      Chemistry    No results found for: NA, K, CL, CO2, BUN, CREATININE, GLU No results found for: CALCIUM, ALKPHOS, AST, ALT, BILITOT, ESTGFRAFRICA, EGFRNONAA     No results found for this or any previous visit.         Anesthesia Plan  Type of Anesthesia, risks & benefits discussed:    Anesthesia Type: Gen Natural Airway  Intra-op Monitoring Plan: Standard ASA Monitors  Post Op Pain Control Plan: multimodal analgesia  Induction:  IV  Informed Consent: Informed consent signed with the Patient and all parties understand the risks and agree with anesthesia plan.  All questions answered.   ASA Score: 2  Day of Surgery Review of History & Physical: H&P Update referred to the surgeon/provider.    Ready For Surgery From Anesthesia Perspective.     .

## 2022-05-02 NOTE — PLAN OF CARE
Left long finger surgical site redressed by resident at bedside.  MD provided instructions to patient regarding care and movement; patient verbalized understanding.  Preparing for departure home.

## 2022-05-02 NOTE — PLAN OF CARE
Patient tolerated procedure well.  VSS, complaints of pain well-controlled with interventions, tolerating po.  Preparing for discharge.  AVS reviewed with patient and family at the bedside by MARIO Lechuga RN. Verbalized understanding of S/S of complications, site care, activity and bathing restrictions, pain control, follow up and general recovery.  Discharge prescriptions delivered to bedside.  IV  removed prior to discharge.    Pt's girlfriend shared that MD would like surgical site re-dressed.  Notified resident who verbalized understanding.  Awaiting arrival of team to bedside.

## 2022-05-04 NOTE — OP NOTE
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 Soto is a 26-year-old male who sustained injury to his index and long finger he underwent a reverse cross-finger flap he presents to clinic for evaluation and ready to separate the fingers on examination appeared in good condition the flap was taken therefore he was consented for flaps operation risks benefits were explained the patient in clinic consents were signed in clinic    Procedure in detail the correct site was marked with the patient's participation holding area the patient was brought to the operating placed supine position underwent MAC anesthesia well-padded nonsterile tourniquet was placed on the left forearm time-out was conducted for the correct procedure to be indicated IV antibiotics were given to the patient preoperatively under sterile conditions an injection of lidocaine 1% was injected as a block the arm was prepped draped normal sterile fashion the arm was elevated tourniquet was insufflated 250 mmHg flap was incised it was debrided good bloody tissue was noted the wound was then sutured with nylon suture both on the with index and the long finger flap appeared well chains as well as the skin graft tourniquet was deflated brisk cap refill sued sterile dressing was  applied patient was placed in sterile soft dressing taken to the recovery area in stable condition    Postop plans patient keep the dressing clean dry intact will see the patient back at 2 weeks time sutures out therapy to be initiated vacation in 1 week    Significant Surgical Tasks Conducted by the Assistant(s), if Applicable: retraction    Complications: No    Estimated Blood Loss (EBL): * No values recorded between 5/2/2022 10:19 AM and 5/2/2022 10:59 AM *           Implants: * No implants in log *    Specimens:   Specimen (24h ago, onward)                None                    Condition: Good    Disposition: PACU - hemodynamically stable.    Attestation: I performed the procedure.    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     ibuprofen 800 MG tablet  Commonly known as: ADVIL,MOTRIN  Take 1 tablet (800 mg total) by mouth every 8 (eight) hours as needed for Pain.     ondansetron 4 MG Tbdl  Commonly known as: ZOFRAN-ODT  Take 1 tablet (4 mg total) by mouth every 6 (six) hours as needed.     pantoprazole 20 MG tablet  Commonly known as: PROTONIX  Take 1 tablet (20 mg total) by mouth once daily.     traMADoL 50 mg tablet  Commonly known as: ULTRAM  Take 1 tablet (50 mg total) by mouth every 6 (six) hours as needed for Pain.            Discharge Procedure Orders   Ambulatory referral/consult to  Physical/Occupational Therapy   Standing Status: Future   Referral Priority: Routine Referral Type: Physical Medicine   Referral Reason: Specialty Services Required   Requested Specialty: Occupational Therapy   Number of Visits Requested: 1     Diet general     Call MD for:  severe uncontrolled pain     Call MD for:  redness, tenderness, or signs of infection (pain, swelling, redness, odor or green/yellow discharge around incision site)     Leave dressing on - Keep it clean, dry, and intact until clinic visit     Lifting restrictions   Order Comments: Light use of hand, keep clean and dry, until follow up.      Follow-up Information       Jacque Medina MD Follow up in 2 week(s).    Specialties: Hand Surgery, Orthopedic Surgery  Why: For suture removal, For wound re-check  Contact information:  [address]  [facility] [address]  [facility]  [facility] [id number] [phone]

## 2022-05-05 ENCOUNTER — DOCUMENTATION ONLY (OUTPATIENT)
Dept: REHABILITATION | Facility: HOSPITAL | Age: 27
End: 2022-05-05
Payer: COMMERCIAL

## 2022-05-05 DIAGNOSIS — S69.92XA HAND INJURY, LEFT, INITIAL ENCOUNTER: Primary | ICD-10-CM

## 2022-05-05 DIAGNOSIS — S68.119A AMPUTATION OF TIP OF FINGER, INITIAL ENCOUNTER: ICD-10-CM

## 2022-05-05 PROBLEM — S61.219A FINGER LACERATION: Status: ACTIVE | Noted: 2022-05-05

## 2022-05-07 NOTE — ANESTHESIA POSTPROCEDURE EVALUATION
Anesthesia Post Evaluation    Patient: Tony Soto    Procedure(s) Performed: Procedure(s) (LRB):  FLAP GRAFT,LEFT INDEX/LONG CROSS FINGER SEPARATION (Left)    Final Anesthesia Type: general      Patient location during evaluation: PACU  Patient participation: Yes- Able to Participate  Level of consciousness: awake and alert  Post-procedure vital signs: reviewed and stable  Pain management: adequate  Airway patency: patent    PONV status at discharge: No PONV  Anesthetic complications: no      Cardiovascular status: blood pressure returned to baseline  Respiratory status: unassisted  Hydration status: euvolemic  Follow-up not needed.          Vitals Value Taken Time   /62 05/02/22 1115   Temp 36.5 °C (97.7 °F) 05/02/22 1100   Pulse 58 05/02/22 1130   Resp 16 05/02/22 1130   SpO2 100 % 05/02/22 1130         Event Time   Out of Recovery 11:31:59         Pain/Raymond Score: No data recorded

## 2022-05-09 ENCOUNTER — CLINICAL SUPPORT (OUTPATIENT)
Dept: REHABILITATION | Facility: HOSPITAL | Age: 27
End: 2022-05-09
Payer: COMMERCIAL

## 2022-05-09 DIAGNOSIS — S68.119A AMPUTATION OF TIP OF FINGER, INITIAL ENCOUNTER: ICD-10-CM

## 2022-05-09 DIAGNOSIS — M25.642 DECREASED RANGE OF MOTION OF FINGER OF LEFT HAND: ICD-10-CM

## 2022-05-09 DIAGNOSIS — R60.0 LOCALIZED EDEMA: ICD-10-CM

## 2022-05-09 DIAGNOSIS — Z78.9 IMPAIRED MOBILITY AND ADLS: ICD-10-CM

## 2022-05-09 DIAGNOSIS — R29.898 IMPAIRED DEXTERITY: ICD-10-CM

## 2022-05-09 DIAGNOSIS — Z78.9 IMPAIRED INSTRUMENTAL ACTIVITIES OF DAILY LIVING (IADL): ICD-10-CM

## 2022-05-09 DIAGNOSIS — S69.91XA HAND INJURY, RIGHT, INITIAL ENCOUNTER: ICD-10-CM

## 2022-05-09 DIAGNOSIS — R29.898 DECREASED GRIP STRENGTH OF LEFT HAND: ICD-10-CM

## 2022-05-09 DIAGNOSIS — S61.219A FINGER LACERATION: ICD-10-CM

## 2022-05-09 DIAGNOSIS — S69.92XA HAND INJURY, LEFT, INITIAL ENCOUNTER: Primary | ICD-10-CM

## 2022-05-09 DIAGNOSIS — Z74.09 IMPAIRED MOBILITY AND ADLS: ICD-10-CM

## 2022-05-09 DIAGNOSIS — M79.642 HAND PAIN, LEFT: ICD-10-CM

## 2022-05-09 DIAGNOSIS — L90.5 SCAR: ICD-10-CM

## 2022-05-09 PROCEDURE — 97760 ORTHOTIC MGMT&TRAING 1ST ENC: CPT | Mod: PN

## 2022-05-09 PROCEDURE — 97166 OT EVAL MOD COMPLEX 45 MIN: CPT | Mod: PN

## 2022-05-09 PROCEDURE — 97530 THERAPEUTIC ACTIVITIES: CPT | Mod: PN

## 2022-05-09 NOTE — PLAN OF CARE
[facility] Occupational Therapy   Hand/Wrist Evaluation/ Custom Orthosis Fabrication     Patient: Tony Soto  Date of Evaluation: 05/05/2022  Referring Physician: Jacque Phillips MD/ Anurag Keenan MD  Medical Diagnosis: S68.119A (ICD-10-CM) - Amputation of tip of finger, initial encounter  S69.92XA (ICD-10-CM) - Hand injury, left, initial encounter  Therapy Diagnosis:   Encounter Diagnoses   Name Primary?    Amputation of tip of finger, initial encounter     Hand injury, left, initial encounter Yes     Authorization Expiration Date: TBD  Total Visits Authorized: 1 for evaluation   Surgical Dates/Procedures:   -5/2/22 L reverse index/long cross-finger separation w/ flap graft   - 4/11/22 L long finger digital nerve repair w/ nerve conduit & extensor tendon repair w/ cross finger flap long to index, L index finger extensor tendon repair   DOI: 4/5/22 Lacerations to L index finger (partial amputation of P3) and L long finger with metal tool   Referral Orders: Eval and treat  Plan of Care Certification Period: 5/9/22-7/25/22  Progress Note Due: 6/9/22   FOTO: Initial evaluation     Visit #: 1/1; 20 visits on POC (including evaluation)  Start Time: 10:00  End Time: 11:20  Total Billable Time: 80 min    Precautions: standard     Orthopedic Precautions:   - Patient is 28 days s/p long finger digital nerve repair w/ nerve conduit & extensor tendon repair w/ cross finger flap long to index, L index finger extensor tendon repair     - Pt is 7 days s/p L reverse index/long finger cross- finger separation w/ flap graft     No past medical history on file.  Current Outpatient Medications   Medication Sig    HYDROcodone-acetaminophen (NORCO) 5-325 mg per tablet Take 1 tablet by mouth every 6 (six) hours as needed for Pain. (Patient not taking: Reported on 4/28/2022)    ibuprofen (ADVIL,MOTRIN) 800 MG tablet Take 1 tablet (800 mg total) by mouth every 8 (eight) hours as needed for Pain. (Patient not  "taking: Reported on 4/28/2022)    ondansetron (ZOFRAN-ODT) 4 MG TbDL Take 1 tablet (4 mg total) by mouth every 6 (six) hours as needed. (Patient not taking: Reported on 4/28/2022)    pantoprazole (PROTONIX) 20 MG tablet Take 1 tablet (20 mg total) by mouth once daily.    traMADoL (ULTRAM) 50 mg tablet Take 1 tablet (50 mg total) by mouth every 6 (six) hours as needed for Pain.     No current facility-administered medications for this visit.     Facility-Administered Medications Ordered in Other Visits   Medication    LIDOcaine (PF) 10 mg/ml (1%) injection 10 mg     Review of patient's allergies indicates:  No Known Allergies    Imaging Reports:  X-Ray Results: R hand 4/5/22  "FINDINGS:  Open fracture amputation injury involving the distal 2nd digit left hand with distal soft tissues and the lateral aspect of the distal tuft of the distal phalanx absent.  No additional fracture identified.  No dislocation     Bandages present about the distal 2nd digit.  No radiopaque foreign body seen.  Soft tissue swelling involving the distal 3rd digit.     Impression:     Open fracture amputation injury involving the distal 2nd digit left hand with distal soft tissues and the lateral aspect of the distal tuft of the distal phalanx absent.  No additional fracture identified. No dislocation     No radiopaque foreign body identified."       Subjective/Occupational Profile   Age: 26 y.o.  Sex: male    Tony Soto is a right-hand dominant male who presents to Outpatient Occupational Therapy for evaluation. Pt presents today 28 days s/p L index finger extensor tendon & nail bed repair and L long finger nerve conduit & extensor tendon repair. In addition, Pt is 7 days s/p L reverse index/long finger cross- finger flap separation secondary to an injury he sustained to his L index & long fingers while at work on 4/5/22 when he was using a metal drilling tool while working on an automobile. Pt reports overall that his pain levels have " been low following his recent surgery on 5/2/22, and that he has not removed his bandages since then.     Home/Environmental History: Pt resides w/ family members.     Assistance level to complete ADLs:   Feeding: Modified Independent   Bathing: Modified Independent   Toileting: Modified Independent   Ambulating: Independent   Grooming: Modified Independent   Dressing upper body: Modified Independent   Dressing lower body: Modified Independent   Meal preparation: Maximum Assistance   Taking/Managing medications: Modified Independent    Work History: Pt works as an ; Pt owns a car shop in [employer]    Driving Status: Pt is currently driving with using his R hand     Activity Level: Active     Date/Mechanism of Injury: 4/5/22 partial amputation of L index finger distal phalanx & laceration of L long finger resulting in nerve injury at work while drilling    Dates of Surgeries:   - 5/2/22 L reverse index/long cross-finger separation w/ flap graft   - 4/11/22 L long finger digital nerve repair w/ nerve conduit & extensor tendon repair w/ cross finger flap long to index; L index finger extensor tendon repair     Involved areas: left index and long fingers     Functional Pain Scale Rating 0-10:   At Rest:  3/10   With Activity: 3/10     Tony's goals for therapy are: Decreased pain, Decreased edema, Protection of surgical/injury site, Promotion of proper wound healing, Prevention of infection, Increased range of motion, Compliance with home program, Increased functional use, Increased strength and Return to work      Objective     Hand dominance: right  Observation: Skin intact, skin dry, and mild edema present through L index and long fingers   Sensation: Radial & Median Nerve diminished but able to detect 0.07 grams force through volar and dorsal aspects of L index and long fingers at all levels consistently   Creighton Lynn Monofilament Test: Yes    Special Tests:   Wound Assessment:  Closed, stitches intact, and scabbing present at volar index and dorsal/radial long fingers   Location: L dorsal long finger; L volar index finger  Color: bruised  Size: 4 cm (long finger); 2.5 cm (index finger)       Edema: Circumferential measurements: as follows:   Location: L hand    Index Middle   Proximal Phalanx 7.5 cm 7 cm   PIP Joint 8 cm 7.3 cm   Middle Phalanx 6.5 cm 7 cm   DIP Joint 7.5 cm 6.8 cm   Distal Phalanx 6.2 cm 6.6 cm       Range of Motion: right active  (Ext/Flex) Index Middle   MP 0*/78*  (R:0*/80*) 0*/75*  (R:0*/90*)   PIP 0*/48*  (R: 0*/100*) 0*/TBA  (R: 0*/100*)   DIP 0*/25*  (R: 0*/85*) 0*/TBA  (R: 0*/90*)   VALLECILLO 151*  (R: 265*) TBA  (R: 280*)       Manual Muscle Test: deferred at this time   Muscle Strength       Strength: (REFUGIO Dynamometer in lbs.) Average 3 trials, Position II- deferred for L hand at this time   Right: 123 lbs  Left: TBA    Pinch Strength: (Pinch Gauge in lbs), Average 3 trials- deferred for L hand at this time   Lateral/Haas Pinch       L) TBA  R) 23.4  3pt Pinch                    L) TBA   R) 19.1  2pt Pinch                    L) TBA   R) 14.5    Fine Harley Coordination Tests: TBA next session   9 hole Peg Test          L)    R)     Problem List: functional limitations: Patient presents with the following functional limitations:   Self Care / ADL: showering, lower body dressing   Work/Activities/Household management tasks: cooking, cleaning, driving, operating tools & machinery   Leisure: video games, lifting weights     Treatment     Treatment Time In: 10:50 min   Treatment Time Out: 11:20 min   Total Treatment time (time-based codes) separate from Evaluation: 30 min     · Custom orthosis fabrication/training x 15 min   · HEP provision and education x 15 min     Home Exercise Program/Education:  Issued HEP (see patient instructions in EMR) and educated on modality use for pain & edema management . Exercises were reviewed and Tony was able to demonstrate them prior  "to the end of the session.   Pt received a written copy of exercises to perform at home. Tony demonstrated good  understanding of the education provided.  Pt was advised to perform these exercises free of pain, and to stop performing them if pain occurs.    Patient/Family Education: role of OT, goals for OT, scheduling/cancellations - Pt verbalized understanding. Discussed insurance limitations with patient.      Outcome Measure: FOTO  CMS Impairment/Limitation/Restriction for FOTO  Upper Extremity Survey    Therapist reviewed FOTO scores for Tony Soto on 5/9/2022.   FOTO documents entered into EPIC - see Media section.    Limitation Score: 43%  Category: Self Care           History Examination Decision Making Complexity Score   Occupational Profile:   Pt's full occupational profile reviewed , including OD(surgical notes), including report of previous therapies.    Medical and Therapy History:     Please see "Plan" section for reference of therapy goals.    Pt with Hx/o  - See Subjective/Occupational Profile     Brief/expanded review:  Expanded              Performance Deficits     Physical    Please see under "problem list" and the assessment portion of this eval note      Cognitive  WNL      Psychosocial:    Pt with ability to work at this time using just one or both hand.    Rating: mild  Treatment to include :  paraffin, fluidotherapy, manual therapy/joint mobilizations,scar massage,   modalities for pain management, iontophoresis with dexamethasone, US 3mhz, therapeutic exercises/activities,        strengthening,       Clinical decision  Making of moderate  complexity, mild  modifications for required to complete eval      Rating:moderate Moderate in combination of the 3 categories      Problem List:   Decreased function of Left UE, Decreased ROM, Increased pain, Decreased strength, Inability to perform work/tasks, Inability to perform leisure activities, and Inability to perform self care tasks    Assessment "   Tony Soto was referred to Outpatient Occupational Therapy with diagnosis of   Encounter Diagnoses   Name Primary?    Amputation of tip of finger, initial encounter     Hand injury, left, initial encounter Yes    presents with the functional limitations as described in the problem list above. Patient can benefit from Outpatient Occupational Therapy services to restore maximum functional use of his L hand to facilitate performance of ADLs and IADLs. The following goals were discussed with the Patient and he is in agreement with them as to be addressed in the treatment plan.     Anticipated barriers to Occupational Therapy: None noted     Pt has no cultural, educational or language barriers to learning provided.      ShortTerm Goals (to be met in 5 weeks or by 6/13/22):   1. Patient to be IND and compliant with HEP & attendance for duration of therapy.  2. Patient will demonstrate increased VALLECILLO in L index & long fingers within 15-20 degrees of his R hand to restore functional hand use for ADLs and IADLs.   3. Assess L hand , pinch, and isolated muscle groups when appropriate and amend LTG if necessary.   4. Assess fine motor dexterity using 9 Hole Peg Test and amend LTG.   5. Patient will report no more than 4/10 pain in L hand.   6. Patient will demonstrate decreased edema through his L index and long fingers by 0.5 cm.       Long Term Goals (to be met in 10 weeks or by DC):   1. Patient to be IND and compliant with HEP & attendance for duration of therapy.  2. Patient will demonstrate increased VALLECILLO in L index & long fingers within 5-10 degrees of his R hand to restore functional hand use for ADLs and IADL.   3. Patient will demonstrate increased L hand  strength within 10 lbs. of his R hand to restore functional grasp for ADLs/work/leisure activities.   4. Patient will demonstrate increased L pinches (3 positions) within 2-3 lbs of his R hand to restore IND with fine motor activities.  5. Patient will  report increase in functional use of his L hand by 25%-35% as evidenced by the FOTO outcome measure.   6. Patient will report no more than 2/10 pain in L hand.       Plan   Tony Soto is to be treated by Occupational Therapy 1-2 times per week for 10 weeks, or 10-20 visits, during the certification period from 5/9/22 to 7/25/22, to achieve the established goals.       Treatment to include: Paraffin/hot packs, manual therapy/joint mobilizations, modalities for pain management, edema control, scar management, joint protection, energy conservation, therapeutic exercises, therapeutic activities, and implementation of a personalized Home Exercise Program (HEP), as well as any other treatments deemed necessary based on the patient's needs or progress.       Thank you for allowing me to assist in the care of your Patient. If you have any questions or concerns, please don't hesitate to e-mail or contact me directly.      GATITO Young MOT   [facility]   Phone: [phone]  Fax: [phone]      I certify the need for these services furnished under this plan of treatment and while under my care                                                                            Referring practitoner/provider       Date

## 2022-05-16 ENCOUNTER — OFFICE VISIT (OUTPATIENT)
Dept: ORTHOPEDICS | Facility: CLINIC | Age: 27
End: 2022-05-16
Payer: COMMERCIAL

## 2022-05-16 VITALS — BODY MASS INDEX: 26.52 KG/M2 | HEIGHT: 66 IN | WEIGHT: 165 LBS

## 2022-05-16 DIAGNOSIS — Z98.890 POST-OPERATIVE STATE: Primary | ICD-10-CM

## 2022-05-16 PROCEDURE — 99024 PR POST-OP FOLLOW-UP VISIT: ICD-10-PCS | Mod: S$GLB,,, | Performed by: PHYSICIAN ASSISTANT

## 2022-05-16 PROCEDURE — 1159F PR MEDICATION LIST DOCUMENTED IN MEDICAL RECORD: ICD-10-PCS | Mod: CPTII,S$GLB,, | Performed by: PHYSICIAN ASSISTANT

## 2022-05-16 PROCEDURE — 99024 POSTOP FOLLOW-UP VISIT: CPT | Mod: S$GLB,,, | Performed by: PHYSICIAN ASSISTANT

## 2022-05-16 PROCEDURE — 99999 PR PBB SHADOW E&M-EST. PATIENT-LVL III: CPT | Mod: PBBFAC,,, | Performed by: PHYSICIAN ASSISTANT

## 2022-05-16 PROCEDURE — 1159F MED LIST DOCD IN RCRD: CPT | Mod: CPTII,S$GLB,, | Performed by: PHYSICIAN ASSISTANT

## 2022-05-16 PROCEDURE — 3008F PR BODY MASS INDEX (BMI) DOCUMENTED: ICD-10-PCS | Mod: CPTII,S$GLB,, | Performed by: PHYSICIAN ASSISTANT

## 2022-05-16 PROCEDURE — 99999 PR PBB SHADOW E&M-EST. PATIENT-LVL III: ICD-10-PCS | Mod: PBBFAC,,, | Performed by: PHYSICIAN ASSISTANT

## 2022-05-16 PROCEDURE — 3008F BODY MASS INDEX DOCD: CPT | Mod: CPTII,S$GLB,, | Performed by: PHYSICIAN ASSISTANT

## 2022-05-16 NOTE — PROGRESS NOTES
"Mr. Soto is here today for a post-operative visit. He is 14 days status post left index and long finger separation by Dr. Medina on 5/2/22.  He is status post left long digital nerve repair with nerve conduit, left long extensor tendon repair, and cross finger flap long to index by Dr. Medina on 4/11/21. He reports that he is doing well. He has begun occupational therapy he reports compliant with long finger custom orthosis. Pain is minimal he is not taking pain medication. He denies fever, chills, and sweats since the time of the surgery.     Physical exam:    Vitals:    05/16/22 1412   Weight: 74.8 kg (165 lb)   Height: 5' 6" (1.676 m)   PainSc: 0-No pain     Vital signs are stable, patient is afebrile.  Patient is well dressed and well groomed, no acute distress.  Alert and oriented to person, place, and time.  Post op dressing taken down.  Incision is clean, dry and intact.  There is no erythema or exudate.  There is no sign of any infection. He is NVI. Sutures removed without difficulty.     Assessment:     status post left index and long finger separation by Dr. Medina on 5/2/22    status post left long digital nerve repair with nerve conduit, left long extensor tendon repair, and cross finger flap long to index by Dr. Medina on 4/11/21    Plan:  Tony was seen today for post-op evaluation and numbness.    Diagnoses and all orders for this visit:    Post-operative state      PO instruction reviewed and provided to patient  Continue OT twice weekly, continue long finger extension orthosis   RTC 4 wks           "
No

## 2022-05-18 NOTE — OP NOTE
[place] - Surgery ([facility])  Surgery Department  Operative Note    SUMMARY     Date of Procedure: 5/2/2022     Procedure: Procedure(s) (LRB):  FLAP GRAFT,LEFT INDEX/LONG CROSS FINGER SEPARATION (Left)     Surgeon(s) and Role:     * Jacque Medina MD - Primary    Addendum:  Preop: Status post cross finger flap from left long finger to index finger  Postop diagnosis: As above

## 2022-05-19 ENCOUNTER — DOCUMENTATION ONLY (OUTPATIENT)
Dept: REHABILITATION | Facility: HOSPITAL | Age: 27
End: 2022-05-19
Payer: COMMERCIAL

## 2022-05-19 DIAGNOSIS — R60.0 LOCALIZED EDEMA: ICD-10-CM

## 2022-05-19 DIAGNOSIS — L90.5 SCAR: ICD-10-CM

## 2022-05-19 DIAGNOSIS — Z78.9 IMPAIRED INSTRUMENTAL ACTIVITIES OF DAILY LIVING (IADL): ICD-10-CM

## 2022-05-19 DIAGNOSIS — Z78.9 IMPAIRED MOBILITY AND ADLS: ICD-10-CM

## 2022-05-19 DIAGNOSIS — M25.642 DECREASED RANGE OF MOTION OF FINGER OF LEFT HAND: Primary | ICD-10-CM

## 2022-05-19 DIAGNOSIS — R29.898 IMPAIRED DEXTERITY: ICD-10-CM

## 2022-05-19 DIAGNOSIS — Z74.09 IMPAIRED MOBILITY AND ADLS: ICD-10-CM

## 2022-05-19 DIAGNOSIS — M79.642 HAND PAIN, LEFT: ICD-10-CM

## 2022-05-19 DIAGNOSIS — R29.898 DECREASED GRIP STRENGTH OF LEFT HAND: ICD-10-CM

## 2022-05-19 NOTE — PROGRESS NOTES
"No Show Note/Documentation    Patient: Tony Soto  Date of Session: 5/19/2022  Diagnosis:   1. Decreased range of motion of finger of left hand     2. Localized edema     3. Scar     4. Decreased  strength of left hand     5. Impaired dexterity     6. Impaired mobility and ADLs     7. Impaired instrumental activities of daily living (IADL)     8. Hand pain, left       MRN: [id number]    Tony Soto did not attend his scheduled therapy appointment today. He did not call to cancel nor reschedule. This is the first appointment that Tony has not attended. Next appointment is scheduled for 5/25/22 and will follow up with patient at that time. No charges have been posted today.     Contacted patient who states "it's Thursday!".  Pt was reminded of his next f/u visit.     GATITO Mello  5/19/2022      "

## 2022-05-24 NOTE — PROGRESS NOTES
[facility]  Occupational Therapy Treatment Note    Date: 5/25/2022  Name: Tony Soto  Clinic Number: [id number]    Therapy Diagnosis:   Encounter Diagnoses   Name Primary?    Decreased range of motion of finger of left hand Yes    Localized edema     Scar     Decreased  strength of left hand     Impaired dexterity     Impaired mobility and ADLs     Impaired instrumental activities of daily living (IADL)     Hand pain, left      Physician: Anurag Keenan MD, Dr. Medina    Physician Orders: Eval and Treat  Medical Diagnosis:   S68.119A (ICD-10-CM) - Amputation of tip of finger, initial encounter  S69.92XA (ICD-10-CM) - Hand injury, left, initial encounter  Surgical Procedure and Date:   -5/2/22 L reverse index/long cross-finger separation w/ flap graft   - 4/11/22 L long finger digital nerve repair w/ nerve conduit & extensor tendon repair w/ cross finger flap long to index, L index finger extensor tendon repair   Evaluation Date: 5/5/22  Insurance Authorization Period Expiration: 12/31/22  Plan of Care Expiration: 5/9/22 to 7/25/22  Date of Return to MD: 6/13/22    Visit # / Visits authorized: 2 / 20  FOTO: 5 th visit    Precautions:  Standard    Time In: 8:33 am  Time Out: 9:18 am  Total Billable Time: 45 minutes  6 weeks post op  SUBJECTIVE     Pt reports: I am doing okay.  I try to wash it but just waiting for the scabs to come off.  Tony was compliant with home exercise program given last session.   Response to previous treatment: no adverse affects  Functional change: nothing reported    Pain: 0/10  Location: left fingers      OBJECTIVE       Tony received the following supervised modalities after being cleared for contradictions for 8 minutes:   -Patient received fluidotherapy to left hand(s) for 8 minutes to increase blood flow, circulation, desensitization, sensory re-education and for pain management.(hand in glove)    Tony received therapeutic exercises for 37  minutes including:  -Isolated PIP and DIP flexion of long finger x 10 reps each  -passive composite flexion of index finger x 10 reps  -place and hold of index finger to palm x 10 reps  -active flexion to palm of index x 10 reps  -active long finger joint blocking of PIP x 10 reps                                MP flexion with IP ext x 10 reps                                Composite flexion x 10 reps  -debridement of eschar on long and index finger reveals epithelialized tissue(      Patient Education and Home Exercises      Education provided:   - remove splint to perform joint motion 2-3 x day x 10 reps of ex  - Progress towards goals     Written Home Exercises Provided: Patient instructed to cont prior HEP.  Exercises were reviewed and Tony was able to demonstrate them prior to the end of the session.  Tony demonstrated good  understanding of the HEP provided. See EMR under Patient Instructions for exercises provided during therapy sessions.       Assessment     Pt would continue to benefit from skilled OT. Tony tolerated therapy well, wounds with epithelialization.  Stiffness of PIP and DIP joints of long finger.  Minimal pain reported during treatment.     Tony is progressing well towards his goals and there are no updates to goals at this time. Pt prognosis is Excellent.     Pt will continue to benefit from skilled outpatient occupational therapy to address the deficits listed in the problem list on initial evaluation provide pt/family education and to maximize pt's level of independence in the home and community environment.     Pt's spiritual, cultural and educational needs considered and pt agreeable to plan of care and goals.    Anticipated barriers to occupational therapy: none noted    Goals:  ShortTerm Goals (to be met in 5 weeks or by 6/13/22):   1. Patient to be IND and compliant with HEP & attendance for duration of therapy.  2. Patient will demonstrate increased VALLECILLO in L index & long fingers within  15-20 degrees of his R hand to restore functional hand use for ADLs and IADLs.   3. Assess L hand , pinch, and isolated muscle groups when appropriate and amend LTG if necessary.   4. Assess fine motor dexterity using 9 Hole Peg Test and amend LTG.   5. Patient will report no more than 4/10 pain in L hand.   6. Patient will demonstrate decreased edema through his L index and long fingers by 0.5 cm.         Long Term Goals (to be met in 10 weeks or by DC):   1. Patient to be IND and compliant with HEP & attendance for duration of therapy.  2. Patient will demonstrate increased VALLECILLO in L index & long fingers within 5-10 degrees of his R hand to restore functional hand use for ADLs and IADL.   3. Patient will demonstrate increased L hand  strength within 10 lbs. of his R hand to restore functional grasp for ADLs/work/leisure activities.   4. Patient will demonstrate increased L pinches (3 positions) within 2-3 lbs of his R hand to restore IND with fine motor activities.  5. Patient will report increase in functional use of his L hand by 25%-35% as evidenced by the FOTO outcome measure.   6. Patient will report no more than 2/10 pain in L hand.     PLAN     Progress with extensor tendon repair per protocol for long finger    Updates/Grading for next session: per protocol for long finger       GATITO Mello

## 2022-05-25 ENCOUNTER — CLINICAL SUPPORT (OUTPATIENT)
Dept: REHABILITATION | Facility: HOSPITAL | Age: 27
End: 2022-05-25
Payer: COMMERCIAL

## 2022-05-25 DIAGNOSIS — Z78.9 IMPAIRED MOBILITY AND ADLS: ICD-10-CM

## 2022-05-25 DIAGNOSIS — L90.5 SCAR: ICD-10-CM

## 2022-05-25 DIAGNOSIS — R60.0 LOCALIZED EDEMA: ICD-10-CM

## 2022-05-25 DIAGNOSIS — R29.898 IMPAIRED DEXTERITY: ICD-10-CM

## 2022-05-25 DIAGNOSIS — R29.898 DECREASED GRIP STRENGTH OF LEFT HAND: ICD-10-CM

## 2022-05-25 DIAGNOSIS — Z74.09 IMPAIRED MOBILITY AND ADLS: ICD-10-CM

## 2022-05-25 DIAGNOSIS — M79.642 HAND PAIN, LEFT: ICD-10-CM

## 2022-05-25 DIAGNOSIS — Z78.9 IMPAIRED INSTRUMENTAL ACTIVITIES OF DAILY LIVING (IADL): ICD-10-CM

## 2022-05-25 DIAGNOSIS — M25.642 DECREASED RANGE OF MOTION OF FINGER OF LEFT HAND: Primary | ICD-10-CM

## 2022-05-25 PROCEDURE — 97110 THERAPEUTIC EXERCISES: CPT | Mod: PN

## 2022-05-25 PROCEDURE — 97022 WHIRLPOOL THERAPY: CPT | Mod: PN

## 2022-05-30 NOTE — PROGRESS NOTES
"[facility]  Occupational Therapy Treatment Note     Date: 5/31/2022  Name: Tony Soto  Clinic Number: [id number]     Therapy Diagnosis:        Encounter Diagnoses   Name Primary?    Decreased range of motion of finger of left hand Yes    Localized edema      Scar      Decreased  strength of left hand      Impaired dexterity      Impaired mobility and ADLs      Impaired instrumental activities of daily living (IADL)      Hand pain, left        Physician: Anurag Keenan MD/ Dr. Jacque Medina     Physician Orders: Eval and Treat  Medical Diagnosis:   S68.119A (ICD-10-CM) - Amputation of tip of finger, initial encounter  S69.92XA (ICD-10-CM) - Hand injury, left, initial encounter  Surgical Procedure and Date:   -5/2/22 L reverse index/long cross-finger separation w/ flap graft   - 4/11/22 L long finger digital nerve repair w/ nerve conduit & extensor tendon repair w/ cross finger flap long to index, L index finger extensor tendon repair   Evaluation Date: 5/5/22  Insurance Authorization Period Expiration: 12/31/22  Plan of Care Expiration: 5/9/22 to 7/25/22  Date of Return to MD: 6/13/22  Progress Note Due: 6/9/22  FOTO: initial evaluation       Visit # / Visits authorized: 3/20 visits on POC (including evaluation) / 20 visit authorized   Time In: 08:35  Time Out: 09:20  Total Billable Time: 45 minutes    Precautions:  Standard    -Pt is currently 7 weeks post op    SUBJECTIVE      Pt reports: "I haven't been wearing the splint since this past Saturday- what's the reason for wearing it?"   - Therapist re-iterated the importance of continuing orthosis wear at this time 24/7 (to be removed for HEP) to protect the fingers while the skin grafts/flaps are healing. Pt reported that the orthosis is comfortable and does not require adjustments.     Tony was partially compliant with home exercise program given last session.     Response to previous treatment: no adverse " affects    Functional change: None reported since last treatment session      Pain: 0/10 (resting pain); 1/10 when trying to attempt composite fist   Location: left index and long digits      OBJECTIVE      Fine Harley Coordination Tests:   9 hole Peg Test          L)  29 seconds  R) 21 seconds       · Tony received the following supervised modalities/therapeutic exercises after being cleared for contradictions for 10 minutes:   -Patient received fluidotherapy to left hand(s) for 8 minutes to increase blood flow, circulation, desensitization, sensory re-education and for pain management.(hand in glove, index finger wrapped in Coban)   - Pt instructed to perform composite digit flexion and extension within fluidotherapy        · Tony participated in the following therapeutic exercises for 35 minutes including:  -Isolated PIP and DIP flexion of long finger x 10 reps each  -Passive composite flexion of index finger x 10 reps  -Place and hold of index finger to palm x 10 reps  -Active flexion to palm of index x 10 reps  -Active long finger joint blocking of PIP x 10 reps                                MP flexion with IP ext x 10 reps                                Composite flexion x 10 reps        Patient Education and Home Exercises       Education provided:   - Progress towards goals   - Importance of compliance w/ orthosis wear & HEP      Written Home Exercises Provided: Yes.   Exercises were reviewed and Tony was able to demonstrate them prior to the end of the session.  Tony demonstrated good  understanding of the HEP provided. See EMR under Patient Instructions for exercises provided 5/9/22 and 5/31/22.      Assessment      Pt tolerated session well, noting good understanding of all education provided as well as proper technique demonstrated w/ additional HEP provided this session. Pt would continue to benefit from skilled OT.    Tony is progressing well towards his goals and there have been updates to goals at  this time. Pt prognosis is Excellent.      Pt will continue to benefit from skilled Outpatient Occupational Therapy to address the deficits listed in the problem list on initial evaluation provide Pt/family education and to maximize Pt's level of independence in the home and community environment.      Pt's spiritual, cultural and educational needs considered and Pt agreeable to Plan of Care and goals.     Anticipated barriers to occupational therapy: none noted       ShortTerm Goals (to be met in 5 weeks or by 6/13/22):   1. Patient to be IND and compliant with HEP & attendance for duration of therapy.  2. Patient will demonstrate increased VALLECILLO in L index & long fingers within 15-20 degrees of his R hand to restore functional hand use for ADLs and IADLs.   3. Assess L hand , pinch, and isolated muscle groups when appropriate and amend LTG if necessary.   4. Assess fine motor dexterity using 9 Hole Peg Test and amend LTG. Goal Met 5/31  5. Patient will report no more than 4/10 pain in L hand.   6. Patient will demonstrate decreased edema through his L index and long fingers by 0.5 cm.         Long Term Goals (to be met in 10 weeks or by DC):   1. Patient to be IND and compliant with HEP & attendance for duration of therapy.  2. Patient will demonstrate increased VALLECILLO in L index & long fingers within 5-10 degrees of his R hand to restore functional hand use for ADLs and IADL.   3. Patient will demonstrate increased L hand  strength within 10 lbs. of his R hand to restore functional grasp for ADLs/work/leisure activities.   4. Patient will demonstrate increased L pinches (3 positions) within 2-3 lbs of his R hand to restore IND with fine motor activities.  5. Patient will report increase in functional use of his L hand by 25%-35% as evidenced by the FOTO outcome measure.   6. Patient will report no more than 2/10 pain in L hand.   7. Patient will demonstrate increased L hand dexterity as evidenced by the 9 Hole  Peg Test by 3-5 seconds.      PLAN      Tony Soto is to be treated by Occupational Therapy 1-2 times per week for 10 weeks, or 10-20 visits, during the certification period from 5/9/22 to 7/25/22, to achieve the established goals.       Updates/Grading for next session: TBD pending progress       GATITO Young MOT  [facility]   Phone: [phone]  Fax: [phone]

## 2022-05-31 ENCOUNTER — CLINICAL SUPPORT (OUTPATIENT)
Dept: REHABILITATION | Facility: HOSPITAL | Age: 27
End: 2022-05-31
Payer: COMMERCIAL

## 2022-05-31 DIAGNOSIS — R29.898 DECREASED GRIP STRENGTH OF LEFT HAND: ICD-10-CM

## 2022-05-31 DIAGNOSIS — R29.898 IMPAIRED DEXTERITY: ICD-10-CM

## 2022-05-31 DIAGNOSIS — Z74.09 IMPAIRED MOBILITY AND ADLS: ICD-10-CM

## 2022-05-31 DIAGNOSIS — M79.642 HAND PAIN, LEFT: ICD-10-CM

## 2022-05-31 DIAGNOSIS — Z78.9 IMPAIRED INSTRUMENTAL ACTIVITIES OF DAILY LIVING (IADL): ICD-10-CM

## 2022-05-31 DIAGNOSIS — S69.92XA HAND INJURY, LEFT, INITIAL ENCOUNTER: ICD-10-CM

## 2022-05-31 DIAGNOSIS — L90.5 SCAR: ICD-10-CM

## 2022-05-31 DIAGNOSIS — M25.642 DECREASED RANGE OF MOTION OF FINGER OF LEFT HAND: Primary | ICD-10-CM

## 2022-05-31 DIAGNOSIS — R60.0 LOCALIZED EDEMA: ICD-10-CM

## 2022-05-31 DIAGNOSIS — Z78.9 IMPAIRED MOBILITY AND ADLS: ICD-10-CM

## 2022-05-31 DIAGNOSIS — S68.119A AMPUTATION OF TIP OF FINGER, INITIAL ENCOUNTER: ICD-10-CM

## 2022-05-31 PROCEDURE — 97110 THERAPEUTIC EXERCISES: CPT | Mod: PN

## 2022-05-31 PROCEDURE — 97022 WHIRLPOOL THERAPY: CPT | Mod: PN

## 2022-06-02 NOTE — PROGRESS NOTES
[facility]  Occupational Therapy Treatment Note     Date: 5/31/2022  Name: Tony Soto  Clinic Number: [id number]     Therapy Diagnosis:        Encounter Diagnoses   Name Primary?    Decreased range of motion of finger of left hand Yes    Localized edema      Scar      Decreased  strength of left hand      Impaired dexterity      Impaired mobility and ADLs      Impaired instrumental activities of daily living (IADL)      Hand pain, left        Physician: Anurag Keenan MD/ Dr. Jacque Medina     Physician Orders: Eval and Treat  Medical Diagnosis:   S68.119A (ICD-10-CM) - Amputation of tip of finger, initial encounter  S69.92XA (ICD-10-CM) - Hand injury, left, initial encounter  Surgical Procedure and Date:   -5/2/22 L reverse index/long cross-finger separation w/ flap graft   - 4/11/22 L long finger digital nerve repair w/ nerve conduit & extensor tendon repair w/ cross finger flap long to index, L index finger extensor tendon repair   Evaluation Date: 5/5/22  Insurance Authorization Period Expiration: 12/31/22  Plan of Care Expiration: 5/9/22 to 7/25/22  Date of Return to MD: 6/13/22  Progress Note Due: 6/9/22  FOTO: initial evaluation       Visit # / Visits authorized: 4/20 visits on POC (including evaluation) / 20 visit authorized   Time In: 8:40 am  Time Out: 9:20 am  Total Billable Time: 40 minutes    Precautions:  Standard    -Pt is currently 7 weeks post op    SUBJECTIVE      Pt reports: I am doing okay, no pain in my hand.  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  Tony was partially compliant with home exercise program provided     Response to previous treatment: no adverse affects  Functional change: None reported since last treatment session      Pain: 0/10 (resting pain); 5/10 stiffness   Location: left index and long digits      OBJECTIVE      · Tony received the following supervised modalities/therapeutic exercises after being cleared for contradictions for 8  minutes:   -Patient received fluidotherapy to left hand(s) for 8 minutes to increase blood flow, circulation, desensitization, sensory re-education and for pain management.    · Tony participated in the following therapeutic exercises for 32 minutes including:  -Isolated PIP  flexion of long and index finger x 10 reps each finger   -joint blocking of DIP joint index and long x 10 reps  -hook, flat and regular fists x 10 reps each  -instrinsic roll with small dowel x 20 reps  Ergo gripper 1 red and yellow band x 3 min with 3 second hold each      Strength: (REFUGIO Dynamometer in lbs.) Average 3 trials, Position II  Right: 114.9#  Left: 53.4#    Pinch Strength: (Pinch Gauge in psi's), Average 3 trials  Haas Pinch R) 24 psi's   L) 17 psi's  3pt Pinch   R) 18.3 psi's  L) 5.3 psi's    3pt pinch yellow clip x 1 minute    Patient Education and Home Exercises       Education provided:   - Progress towards goals      Written Home Exercises Provided: continue with previously instructed HEP   Exercises were reviewed and Tony was able to demonstrate them prior to the end of the session.  Tony demonstrated good  understanding of the HEP provided. See EMR under Patient Instructions for exercises provided 5/9/22 and 5/31/22.      Assessment      Tony demonstrated improvement in finger (index and long) flexibility post therapy session.  Pt was initially guarded with applying pressure to fingers with exercises but more confident post therapy.  Weakness noted with  and pinch strength.     Pt would continue to benefit from skilled OT.    Tony is progressing well towards his goals and there have been updates to goals at this time. Pt prognosis is Excellent.      Pt will continue to benefit from skilled Outpatient Occupational Therapy to address the deficits listed in the problem list on initial evaluation provide Pt/family education and to maximize Pt's level of independence in the home and community environment.      Pt's  spiritual, cultural and educational needs considered and Pt agreeable to Plan of Care and goals.     Anticipated barriers to occupational therapy: none noted       ShortTerm Goals (to be met in 5 weeks or by 6/13/22):   1. Patient to be IND and compliant with HEP & attendance for duration of therapy.  2. Patient will demonstrate increased VALLECILLO in L index & long fingers within 15-20 degrees of his R hand to restore functional hand use for ADLs and IADLs.   3. Assess L hand , pinch, and isolated muscle groups when appropriate and amend LTG if necessary.   4. Assess fine motor dexterity using 9 Hole Peg Test and amend LTG. Goal Met 5/31  5. Patient will report no more than 4/10 pain in L hand.   6. Patient will demonstrate decreased edema through his L index and long fingers by 0.5 cm.         Long Term Goals (to be met in 10 weeks or by DC):   1. Patient to be IND and compliant with HEP & attendance for duration of therapy.  2. Patient will demonstrate increased VALLECILLO in L index & long fingers within 5-10 degrees of his R hand to restore functional hand use for ADLs and IADL.   3. Patient will demonstrate increased L hand  strength within 10 lbs. of his R hand to restore functional grasp for ADLs/work/leisure activities.   4. Patient will demonstrate increased L pinches (3 positions) within 2-3 lbs of his R hand to restore IND with fine motor activities.  5. Patient will report increase in functional use of his L hand by 25%-35% as evidenced by the FOTO outcome measure.   6. Patient will report no more than 2/10 pain in L hand.   7. Patient will demonstrate increased L hand dexterity as evidenced by the 9 Hole Peg Test by 3-5 seconds.      PLAN      Tony Soto is to be treated by Occupational Therapy 1-2 times per week for 10 weeks, or 10-20 visits, during the certification period from 5/9/22 to 7/25/22, to achieve the established goals.       Updates/Grading for next session: TBD pending progress

## 2022-06-02 NOTE — PROGRESS NOTES
"[facility]  Occupational Therapy Progress & Treatment Note     Date: 6/7/2022  Name: Tony Soto  Clinic Number: [id number]     Therapy Diagnosis:        Encounter Diagnoses   Name Primary?    Decreased range of motion of finger of left hand Yes    Localized edema      Scar      Decreased  strength of left hand      Impaired dexterity      Impaired mobility and ADLs      Impaired instrumental activities of daily living (IADL)      Hand pain, left        Physician: Anurag Keenan MD/ Dr. Jacque Medina     Physician Orders: Eval and Treat  Medical Diagnosis:   S68.119A (ICD-10-CM) - Amputation of tip of finger, initial encounter  S69.92XA (ICD-10-CM) - Hand injury, left, initial encounter  Surgical Procedure and Date:   -5/2/22 L reverse index/long cross-finger separation w/ flap graft   - 4/11/22 L long finger digital nerve repair w/ nerve conduit & extensor tendon repair w/ cross finger flap long to index, L index finger extensor tendon repair   Evaluation Date: 5/5/22  Insurance Authorization Period Expiration: 12/31/22  Plan of Care Expiration: 5/9/22 to 7/25/22  Date of Return to MD: 6/13/22  Progress Note Due: 7/7/22  FOTO: initial evaluation, 5th visit            Visit # / Visits authorized: 5/20 visits on POC (including evaluation) / 20 visit authorized   Time In: 08:31  Time Out: 09:10  Total Billable Time: 39 minutes    Precautions:  Standard    -Pt is currently 8 weeks post op    SUBJECTIVE      Pt reports: "My hand is doing pretty good. I bumped it at work- it's just a little cut here. Nothing bad. Overall, I'm just a little stiff- but not a big deal at all."     Tony was compliant with home exercise program given last session.     Response to previous treatment: no adverse affects    Functional change: "It's easier to pick everything up- like smaller objects."      Pain: 0/10 (resting pain); 1/10 when trying to attempt composite fist   Location: left index and " long digits      OBJECTIVE      · Re-Assessment performed with measurements and report taken x 20 min     Edema: Circumferential measurements: as follows:   Location: L hand     Index Middle   Proximal Phalanx 6.8 cm 6.5 cm   PIP Joint 6 cm 6.5 cm   Middle Phalanx 5.6 cm 6.1 cm   DIP Joint 5.3 cm 5.8 cm   Distal Phalanx 5 cm 4.5 cm         Range of Motion: right active  (Ext/Flex) Index Middle   MP 0*/80*  (R:0*/80*) 0*/85*  (R:0*/90*)   PIP 0*/98*  (R: 0*/100*) 0*/95*  (R: 0*/100*)   DIP 0*/70*  (R: 0*/85*) 0*/75  (R: 0*/90*)   VALLECILLO 248*  (R: 265*) 255*  (R: 280*)         Manual Muscle Test:   Muscle Strength    Median Innervated:  FDP 1&2       4/5  FDS  1&2          4/5      Radial Nerve Innervated:  EI                4/5     Strength: (REFUGIO Dynamometer in lbs.) Average 3 trials, Position II  Right: 123 lbs  Left: 64 lbs     Pinch Strength: (Pinch Gauge in lbs), Average 3 trials  Lateral/Haas Pinch       L) 17.9  R) 23.4  3pt Pinch                    L) 4.7    R) 19.1  2pt Pinch                    L) 4.9   R) 14.5      Outcome Measure: FOTO  CMS Impairment/Limitation/Restriction for FOTO  Upper Extremity Survey     Therapist reviewed FOTO scores for Tony Soto on 6/7/22.   FOTO documents entered into Therapeutic Monitoring [employer] - see Media section.     Limitation Score: 41% (2% improvement)   Category: Self Care           · Tony participated in the following therapeutic exercises for 19 minutes (as part of HEP) including:  -Isolated PIP and DIP flexion of long & index fingers x 10 reps each  - Theraputty (red) for the following: composite , lateral/key pinch, 3pt pinch, and lumbrical pinch x 3 min each      Patient Education and Home Exercises       Education provided:   - Progress towards goals   - Importance of compliance w/ HEP      Written Home Exercises Provided: Yes. Red Theraputty provided.   Exercises were reviewed and Tony was able to demonstrate them prior to the end of the session.  Tony demonstrated good   understanding of the HEP provided. See EMR under Patient Instructions for exercises provided 5/9/22, 5/31/22, and 6/7/22.      Assessment      Pt has met 5 out of 6 STG established for therapy noting significant decrease in edema, as well as increased VALLECILLO of both index and long fingers, w/ residual ROM limitations mainly at the DIP joints. Pt reports minimal to no finger pain within the past 3 days w/ functional use of his hand at work and at home. Pt would continue to benefit from skilled OT.  Tony is progressing well towards his goals and there have been updates to goals at this time. Pt prognosis is Excellent.      Pt will continue to benefit from skilled Outpatient Occupational Therapy to address the deficits listed in the problem list on initial evaluation provide Pt/family education and to maximize Pt's level of independence in the home and community environment.      Pt's spiritual, cultural and educational needs considered and Pt agreeable to Plan of Care and goals.       Anticipated barriers to occupational therapy: none noted       ShortTerm Goals (to be met in 5 weeks or by 6/13/22):   1. Patient to be IND and compliant with HEP & attendance for duration of therapy. In progress   2. Patient will demonstrate increased VALLECILLO in L index & long fingers within 15-20 degrees of his R hand to restore functional hand use for ADLs and IADLs. Partially Met 6/7  3. Assess L hand , pinch, and isolated muscle groups when appropriate and amend LTG if necessary. Goal Met 6/7  4. Assess fine motor dexterity using 9 Hole Peg Test and amend LTG. Goal Met 5/31  5. Patient will report no more than 4/10 pain in L hand. Goal Met 6/7  6. Patient will demonstrate decreased edema through his L index and long fingers by 0.5 cm. Goal Met 6/7        Updated Long Term Goals (to be met in 10 weeks or by DC):   1. Patient to be IND and compliant with HEP & attendance for duration of therapy.  2. Patient will demonstrate increased VALLECILLO  in L index & long fingers within 5-10 degrees of his R hand to restore functional hand use for ADLs and IADL.   3. Patient will demonstrate increased L hand  strength within 10 lbs. of his R hand to restore functional grasp for ADLs/work/leisure activities.   4. Patient will demonstrate increased L pinches (3 positions) within 2-3 lbs of his R hand to restore IND with fine motor activities.  5. Patient will report increase in functional use of his L hand by 25%-35% as evidenced by the FOTO outcome measure.   6. Patient will report no more than 2/10 pain in L hand.   7. Patient will demonstrate increased L hand dexterity as evidenced by the 9 Hole Peg Test by 3-5 seconds.   8. Patient will demonstrate increased L index and long finger FDS/FDP strength to 5/5 to facilitate performance of work-related tasks.      PLAN      Tony Soto is to be treated by Occupational Therapy 1-2 times per week for 10 weeks, or 10-20 visits, during the certification period from 5/9/22 to 7/25/22, to achieve the established goals.       Updates/Grading for next session: TBD pending progress       GATITO Young MOT  [facility]   Phone: [phone]  Fax: [phone]

## 2022-06-03 ENCOUNTER — CLINICAL SUPPORT (OUTPATIENT)
Dept: REHABILITATION | Facility: HOSPITAL | Age: 27
End: 2022-06-03
Payer: COMMERCIAL

## 2022-06-03 DIAGNOSIS — L90.5 SCAR: ICD-10-CM

## 2022-06-03 DIAGNOSIS — R60.0 LOCALIZED EDEMA: ICD-10-CM

## 2022-06-03 DIAGNOSIS — Z78.9 IMPAIRED MOBILITY AND ADLS: ICD-10-CM

## 2022-06-03 DIAGNOSIS — M79.642 HAND PAIN, LEFT: ICD-10-CM

## 2022-06-03 DIAGNOSIS — R29.898 IMPAIRED DEXTERITY: ICD-10-CM

## 2022-06-03 DIAGNOSIS — Z74.09 IMPAIRED MOBILITY AND ADLS: ICD-10-CM

## 2022-06-03 DIAGNOSIS — Z78.9 IMPAIRED INSTRUMENTAL ACTIVITIES OF DAILY LIVING (IADL): ICD-10-CM

## 2022-06-03 DIAGNOSIS — R29.898 DECREASED GRIP STRENGTH OF LEFT HAND: ICD-10-CM

## 2022-06-03 DIAGNOSIS — M25.642 DECREASED RANGE OF MOTION OF FINGER OF LEFT HAND: Primary | ICD-10-CM

## 2022-06-03 PROCEDURE — 97110 THERAPEUTIC EXERCISES: CPT | Mod: PN

## 2022-06-03 PROCEDURE — 97022 WHIRLPOOL THERAPY: CPT | Mod: PN

## 2022-06-07 ENCOUNTER — CLINICAL SUPPORT (OUTPATIENT)
Dept: REHABILITATION | Facility: HOSPITAL | Age: 27
End: 2022-06-07
Payer: COMMERCIAL

## 2022-06-07 DIAGNOSIS — R29.898 DECREASED GRIP STRENGTH OF LEFT HAND: ICD-10-CM

## 2022-06-07 DIAGNOSIS — R60.0 LOCALIZED EDEMA: ICD-10-CM

## 2022-06-07 DIAGNOSIS — Z78.9 IMPAIRED INSTRUMENTAL ACTIVITIES OF DAILY LIVING (IADL): ICD-10-CM

## 2022-06-07 DIAGNOSIS — S69.92XA HAND INJURY, LEFT, INITIAL ENCOUNTER: ICD-10-CM

## 2022-06-07 DIAGNOSIS — Z74.09 IMPAIRED MOBILITY AND ADLS: ICD-10-CM

## 2022-06-07 DIAGNOSIS — M25.642 DECREASED RANGE OF MOTION OF FINGER OF LEFT HAND: ICD-10-CM

## 2022-06-07 DIAGNOSIS — R29.898 IMPAIRED DEXTERITY: ICD-10-CM

## 2022-06-07 DIAGNOSIS — M79.642 HAND PAIN, LEFT: ICD-10-CM

## 2022-06-07 DIAGNOSIS — L90.5 SCAR: ICD-10-CM

## 2022-06-07 DIAGNOSIS — S68.119A AMPUTATION OF TIP OF FINGER, INITIAL ENCOUNTER: Primary | ICD-10-CM

## 2022-06-07 DIAGNOSIS — Z78.9 IMPAIRED MOBILITY AND ADLS: ICD-10-CM

## 2022-06-07 PROCEDURE — 97110 THERAPEUTIC EXERCISES: CPT | Mod: PN

## 2022-06-07 PROCEDURE — 97530 THERAPEUTIC ACTIVITIES: CPT | Mod: PN

## 2022-06-09 ENCOUNTER — CLINICAL SUPPORT (OUTPATIENT)
Dept: REHABILITATION | Facility: HOSPITAL | Age: 27
End: 2022-06-09
Payer: COMMERCIAL

## 2022-06-09 DIAGNOSIS — R60.0 LOCALIZED EDEMA: ICD-10-CM

## 2022-06-09 DIAGNOSIS — Z74.09 IMPAIRED MOBILITY AND ADLS: ICD-10-CM

## 2022-06-09 DIAGNOSIS — M25.642 DECREASED RANGE OF MOTION OF FINGER OF LEFT HAND: Primary | ICD-10-CM

## 2022-06-09 DIAGNOSIS — Z78.9 IMPAIRED MOBILITY AND ADLS: ICD-10-CM

## 2022-06-09 DIAGNOSIS — Z78.9 IMPAIRED INSTRUMENTAL ACTIVITIES OF DAILY LIVING (IADL): ICD-10-CM

## 2022-06-09 DIAGNOSIS — L90.5 SCAR: ICD-10-CM

## 2022-06-09 DIAGNOSIS — R29.898 IMPAIRED DEXTERITY: ICD-10-CM

## 2022-06-09 DIAGNOSIS — M79.642 HAND PAIN, LEFT: ICD-10-CM

## 2022-06-09 DIAGNOSIS — R29.898 DECREASED GRIP STRENGTH OF LEFT HAND: ICD-10-CM

## 2022-06-09 PROCEDURE — 97022 WHIRLPOOL THERAPY: CPT | Mod: PN

## 2022-06-09 PROCEDURE — 97110 THERAPEUTIC EXERCISES: CPT | Mod: PN

## 2022-06-09 NOTE — PROGRESS NOTES
[facility]  Occupational Therapy Treatment Note     Date: 5/31/2022  Name: Tony Soto  Clinic Number: [id number]     Therapy Diagnosis:        Encounter Diagnoses   Name Primary?    Decreased range of motion of finger of left hand Yes    Localized edema      Scar      Decreased  strength of left hand      Impaired dexterity      Impaired mobility and ADLs      Impaired instrumental activities of daily living (IADL)      Hand pain, left        Physician: Anurag Keenan MD/ Dr. Jacque Medina     Physician Orders: Eval and Treat  Medical Diagnosis:   S68.119A (ICD-10-CM) - Amputation of tip of finger, initial encounter  S69.92XA (ICD-10-CM) - Hand injury, left, initial encounter  Surgical Procedure and Date:   -5/2/22 L reverse index/long cross-finger separation w/ flap graft   - 4/11/22 L long finger digital nerve repair w/ nerve conduit & extensor tendon repair w/ cross finger flap long to index, L index finger extensor tendon repair   Evaluation Date: 5/5/22  Insurance Authorization Period Expiration: 12/31/22  Plan of Care Expiration: 5/9/22 to 7/25/22  Date of Return to MD: 6/13/22  Progress Note Due: 6/9/22  FOTO: initial evaluation       Visit # / Visits authorized 6/20 visits (including evaluation)    Time In: 10:51 am  Time Out: 11:32 am  Total Billable Time: 41 minutes    Precautions:  Standard    -Pt is currently 8 weeks post op    SUBJECTIVE      Pt reports: I am feeling about the same no real pain only stiffness.  Tony somewhat compliant with home exercise program provided   Response to previous treatment: a little sore  Functional change: None reported since last treatment session      Pain: 0/10 (resting pain); 4/10 stiffness   Location: left index and long digits      OBJECTIVE      · Tony received the following supervised modalities/therapeutic exercises after being cleared for contradictions for 8 minutes:   -Patient received fluidotherapy to left hand(s) for  8 minutes to increase blood flow, circulation, desensitization, sensory re-education and for pain management.    · Tony participated in the following therapeutic exercises for 33 minutes including:  -instrinsic roll with small dowel x 20 reps  -red clip 3pt pinch removal and replace  dowels in pegboard  -yellow putty tool:  Small knob CCW and CW rotation x 10 reps each                                Flat key tool x 5 rotations CW and CCW  -ultra gripper setting # 2 3 min with 3 second holds.    Patient Education and Home Exercises       Education provided:   - Progress towards goals      Written Home Exercises Provided: continue with previously instructed HEP   Exercises were reviewed and Tony was able to demonstrate them prior to the end of the session.  Tony demonstrated good  understanding of the HEP provided. See EMR under Patient Instructions for exercises provided 5/9/22 and 5/31/22.      Assessment      Tony is able to make a composite fist.  Improvement in sensitivity of several locations on long finger.  Tolerated resistive exercise with appropriate fatigue.      Pt would continue to benefit from skilled OT.      Tony is progressing well towards his goals and there have been updates to goals at this time. Pt prognosis is Excellent.      Pt will continue to benefit from skilled Outpatient Occupational Therapy to address the deficits listed in the problem list on initial evaluation provide Pt/family education and to maximize Pt's level of independence in the home and community environment.      Pt's spiritual, cultural and educational needs considered and Pt agreeable to Plan of Care and goals.     Anticipated barriers to occupational therapy: none noted    ShortTerm Goals (to be met in 5 weeks or by 6/13/22):   1. Patient to be IND and compliant with HEP & attendance for duration of therapy. In progress   2. Patient will demonstrate increased VALLECILLO in L index & long fingers within 15-20 degrees of his R hand  to restore functional hand use for ADLs and IADLs. Partially Met 6/7  3. Assess L hand , pinch, and isolated muscle groups when appropriate and amend LTG if necessary. Goal Met 6/7  4. Assess fine motor dexterity using 9 Hole Peg Test and amend LTG. Goal Met 5/31  5. Patient will report no more than 4/10 pain in L hand. Goal Met 6/7  6. Patient will demonstrate decreased edema through his L index and long fingers by 0.5 cm. Goal Met 6/7     Long Term Goals (to be met in 10 weeks or by DC):   1. Patient to be IND and compliant with HEP & attendance for duration of therapy.  2. Patient will demonstrate increased VALLECILLO in L index & long fingers within 5-10 degrees of his R hand to restore functional hand use for ADLs and IADL.   3. Patient will demonstrate increased L hand  strength within 10 lbs. of his R hand to restore functional grasp for ADLs/work/leisure activities.   4. Patient will demonstrate increased L pinches (3 positions) within 2-3 lbs of his R hand to restore IND with fine motor activities.  5. Patient will report increase in functional use of his L hand by 25%-35% as evidenced by the FOTO outcome measure.   6. Patient will report no more than 2/10 pain in L hand.   7. Patient will demonstrate increased L hand dexterity as evidenced by the 9 Hole Peg Test by 3-5 seconds.      PLAN      Tony Soto is to be treated by Occupational Therapy 1-2 times per week for 10 weeks, or 10-20 visits, during the certification period from 5/9/22 to 7/25/22, to achieve the established goals.       Updates/Grading for next session: TBD pending progress       GATITO Mello

## 2022-06-13 ENCOUNTER — OFFICE VISIT (OUTPATIENT)
Dept: ORTHOPEDICS | Facility: CLINIC | Age: 27
End: 2022-06-13
Payer: COMMERCIAL

## 2022-06-13 VITALS — WEIGHT: 164.88 LBS | BODY MASS INDEX: 26.5 KG/M2 | HEIGHT: 66 IN

## 2022-06-13 DIAGNOSIS — Z98.890 POST-OPERATIVE STATE: Primary | ICD-10-CM

## 2022-06-13 PROCEDURE — 3008F PR BODY MASS INDEX (BMI) DOCUMENTED: ICD-10-PCS | Mod: CPTII,S$GLB,, | Performed by: PHYSICIAN ASSISTANT

## 2022-06-13 PROCEDURE — 99024 PR POST-OP FOLLOW-UP VISIT: ICD-10-PCS | Mod: S$GLB,,, | Performed by: PHYSICIAN ASSISTANT

## 2022-06-13 PROCEDURE — 99024 POSTOP FOLLOW-UP VISIT: CPT | Mod: S$GLB,,, | Performed by: PHYSICIAN ASSISTANT

## 2022-06-13 PROCEDURE — 1159F MED LIST DOCD IN RCRD: CPT | Mod: CPTII,S$GLB,, | Performed by: PHYSICIAN ASSISTANT

## 2022-06-13 PROCEDURE — 99999 PR PBB SHADOW E&M-EST. PATIENT-LVL III: CPT | Mod: PBBFAC,,, | Performed by: PHYSICIAN ASSISTANT

## 2022-06-13 PROCEDURE — 1159F PR MEDICATION LIST DOCUMENTED IN MEDICAL RECORD: ICD-10-PCS | Mod: CPTII,S$GLB,, | Performed by: PHYSICIAN ASSISTANT

## 2022-06-13 PROCEDURE — 99999 PR PBB SHADOW E&M-EST. PATIENT-LVL III: ICD-10-PCS | Mod: PBBFAC,,, | Performed by: PHYSICIAN ASSISTANT

## 2022-06-13 PROCEDURE — 3008F BODY MASS INDEX DOCD: CPT | Mod: CPTII,S$GLB,, | Performed by: PHYSICIAN ASSISTANT

## 2022-06-13 NOTE — PROGRESS NOTES
[facility] OUTPAT[facility] THERAPY AND WELLNESS  Occupational Therapy Treatment Note     Date: 5/31/2022  Name: Tony Soto  Clinic Number: [id number]     Therapy Diagnosis:        Encounter Diagnoses   Name Primary?    Decreased range of motion of finger of left hand Yes    Localized edema      Scar      Decreased  strength of left hand      Impaired dexterity      Impaired mobility and ADLs      Impaired instrumental activities of daily living (IADL)      Hand pain, left        Physician: Anurag Keenan MD/ Dr. Jacque Medina     Physician Orders: Eval and Treat  Medical Diagnosis:   S68.119A (ICD-10-CM) - Amputation of tip of finger, initial encounter  S69.92XA (ICD-10-CM) - Hand injury, left, initial encounter  Surgical Procedure and Date:   -5/2/22 L reverse index/long cross-finger separation w/ flap graft   - 4/11/22 L long finger digital nerve repair w/ nerve conduit & extensor tendon repair w/ cross finger flap long to index, L index finger extensor tendon repair   Evaluation Date: 5/5/22  Insurance Authorization Period Expiration: 12/31/22  Plan of Care Expiration: 5/9/22 to 7/25/22  Date of Return to MD: 6/13/22  Progress Note Due: 6/9/22  FOTO: initial evaluation       Visit # / Visits authorized 7/20 visits (including evaluation)    Time In: 10:03  am  Time Out: 10:50 am  Total Billable Time: 47 minutes    Precautions:  Standard    -Pt is currently 8 weeks post op    SUBJECTIVE      Pt reports: I am doing good, but I don't really use my index finger.  Tony somewhat compliant with home exercise program provided   Response to previous treatment: sore  Functional change: None reported      Pain: 0/10 (resting pain); 3/10 stiffness   Location: left index and long digits      OBJECTIVE      · Tony received the following supervised modalities/therapeutic exercises after being cleared for contradictions for 8 minutes:   -Patient received fluidotherapy to left hand(s) for 8 minutes to increase blood flow,  circulation, desensitization, sensory re-education and for pain management.    · Tony participated in the following therapeutic exercises for 39 minutes including:  -instrinsic roll with small dowel x 20 reps  -red clip 3pt pinch removal and replace  dowels in pegboard x 42 reps  -hook to MP flex to hook and extension x 20 reps  -ultra gripper setting # 2 4 min with 3 second holds.  -long finger abd/add on table top x 20 reps                     Extension from table top x 20 reps    Patient Education and Home Exercises       Education provided:   - Progress towards goals   -avoid heavy work with left hand (no greater then 10#)  - cautious use to avoid injury to skin or joints  -regarding joint stiffness secondary to excess fluid in small joints     Written Home Exercises Provided: continue with previously instructed HEP   Exercises were reviewed and Tony was able to demonstrate them prior to the end of the session.  Tony demonstrated good  understanding of the HEP provided. See EMR under Patient Instructions for exercises provided 5/9/22 and 5/31/22.      Assessment      Tony is able to make a  composite fist with left hand with some stiffness reported in DIP joint. He is progressing well.      Pt would continue to benefit from skilled OT.      Tony is progressing well towards his goals and there have been updates to goals at this time. Pt prognosis is Excellent.      Pt will continue to benefit from skilled Outpatient Occupational Therapy to address the deficits listed in the problem list on initial evaluation provide Pt/family education and to maximize Pt's level of independence in the home and community environment.      Pt's spiritual, cultural and educational needs considered and Pt agreeable to Plan of Care and goals.     Anticipated barriers to occupational therapy: none noted    ShortTerm Goals (to be met in 5 weeks or by 6/13/22):   1. Patient to be IND and compliant with HEP & attendance for duration of  therapy. In progress   2. Patient will demonstrate increased VALLECILLO in L index & long fingers within 15-20 degrees of his R hand to restore functional hand use for ADLs and IADLs. Partially Met 6/7  3. Assess L hand , pinch, and isolated muscle groups when appropriate and amend LTG if necessary. Goal Met 6/7  4. Assess fine motor dexterity using 9 Hole Peg Test and amend LTG. Goal Met 5/31  5. Patient will report no more than 4/10 pain in L hand. Goal Met 6/7  6. Patient will demonstrate decreased edema through his L index and long fingers by 0.5 cm. Goal Met 6/7     Long Term Goals (to be met in 10 weeks or by DC):   1. Patient to be IND and compliant with HEP & attendance for duration of therapy.  2. Patient will demonstrate increased VALLECILLO in L index & long fingers within 5-10 degrees of his R hand to restore functional hand use for ADLs and IADL.   3. Patient will demonstrate increased L hand  strength within 10 lbs. of his R hand to restore functional grasp for ADLs/work/leisure activities.   4. Patient will demonstrate increased L pinches (3 positions) within 2-3 lbs of his R hand to restore IND with fine motor activities.  5. Patient will report increase in functional use of his L hand by 25%-35% as evidenced by the FOTO outcome measure.   6. Patient will report no more than 2/10 pain in L hand.   7. Patient will demonstrate increased L hand dexterity as evidenced by the 9 Hole Peg Test by 3-5 seconds.      PLAN      Tony Soto is to be treated by Occupational Therapy 1-2 times per week for 10 weeks, or 10-20 visits, during the certification period from 5/9/22 to 7/25/22, to achieve the established goals.       Updates/Grading for next session: TBD pending progress       GATITO Mello

## 2022-06-13 NOTE — PROGRESS NOTES
"Mr. Soto is here today for a post-operative visit. He is 6 weeks status post left index and long finger separation by Dr. Medina on 5/2/22.  He is status post left long digital nerve repair with nerve conduit, left long extensor tendon repair, and cross finger flap long to index by Dr. Medina on 4/11/21. He reports that he is doing well. He has been attending occupational therapy. He has not been using the finger splint at work he reports he is wrapping the hand and wearing gloves. He recently sustained a small superficial abrasion to the radial index at work he reports tetatnus UTD. Pain is minimal he is not taking pain medication. He denies fever, chills, and sweats since the time of the surgery.     Physical exam:    Vitals:    06/13/22 0911   Weight: 74.8 kg (164 lb 14.5 oz)   Height: 5' 6" (1.676 m)   PainSc: 0-No pain     Vital signs are stable, patient is afebrile.  Patient is well dressed and well groomed, no acute distress.  Alert and oriented to person, place, and time.  Incision is well healed.  There is no erythema or exudate.  There is no sign of any infection. He is NVI. Good ROM. Small superficial abrasion to the radial index at P2, healing well no signs of infection this is superficial.    Assessment:   status post left index and long finger separation by Dr. Medina on 5/2/22    status post left long digital nerve repair with nerve conduit, left long extensor tendon repair, and cross finger flap long to index by Dr. Medina on 4/11/21    Plan:  Tony was seen today for post-op evaluation.    Diagnoses and all orders for this visit:    Post-operative state      PO instruction reviewed and provided to patient  Continue regular occupational therapy. Discussed splint use as instructed   Bacitracin for index abrasion discussed monitoring  RTC 6 wks        "

## 2022-06-14 ENCOUNTER — CLINICAL SUPPORT (OUTPATIENT)
Dept: REHABILITATION | Facility: HOSPITAL | Age: 27
End: 2022-06-14
Payer: COMMERCIAL

## 2022-06-14 DIAGNOSIS — M25.642 DECREASED RANGE OF MOTION OF FINGER OF LEFT HAND: Primary | ICD-10-CM

## 2022-06-14 DIAGNOSIS — R29.898 IMPAIRED DEXTERITY: ICD-10-CM

## 2022-06-14 DIAGNOSIS — R60.0 LOCALIZED EDEMA: ICD-10-CM

## 2022-06-14 DIAGNOSIS — Z78.9 IMPAIRED INSTRUMENTAL ACTIVITIES OF DAILY LIVING (IADL): ICD-10-CM

## 2022-06-14 DIAGNOSIS — Z74.09 IMPAIRED MOBILITY AND ADLS: ICD-10-CM

## 2022-06-14 DIAGNOSIS — Z78.9 IMPAIRED MOBILITY AND ADLS: ICD-10-CM

## 2022-06-14 DIAGNOSIS — L90.5 SCAR: ICD-10-CM

## 2022-06-14 DIAGNOSIS — R29.898 DECREASED GRIP STRENGTH OF LEFT HAND: ICD-10-CM

## 2022-06-14 DIAGNOSIS — M79.642 HAND PAIN, LEFT: ICD-10-CM

## 2022-06-14 PROCEDURE — 97022 WHIRLPOOL THERAPY: CPT | Mod: PN

## 2022-06-14 PROCEDURE — 97110 THERAPEUTIC EXERCISES: CPT | Mod: PN

## 2022-06-14 NOTE — PROGRESS NOTES
[facility]  Occupational Therapy Treatment Note     Date: 5/31/2022  Name: Tony Soto  Clinic Number: [id number]     Therapy Diagnosis:        Encounter Diagnoses   Name Primary?    Decreased range of motion of finger of left hand Yes    Localized edema      Scar      Decreased  strength of left hand      Impaired dexterity      Impaired mobility and ADLs      Impaired instrumental activities of daily living (IADL)      Hand pain, left        Physician: Anurag Keenan MD/ Dr. Jacque Medina     Physician Orders: Eval and Treat  Medical Diagnosis:   S68.119A (ICD-10-CM) - Amputation of tip of finger, initial encounter  S69.92XA (ICD-10-CM) - Hand injury, left, initial encounter  Surgical Procedure and Date:   -5/2/22 L reverse index/long cross-finger separation w/ flap graft   - 4/11/22 L long finger digital nerve repair w/ nerve conduit & extensor tendon repair w/ cross finger flap long to index, L index finger extensor tendon repair   Evaluation Date: 5/5/22  Insurance Authorization Period Expiration: 12/31/22  Plan of Care Expiration: 5/9/22 to 7/25/22  Date of Return to MD: 6/13/22  Progress Note Due: 6/9/22  FOTO: initial evaluation       Visit # / Visits authorized 8/20 visits (including evaluation)    Time In: 10:55 am  Time Out: 11:30 am  Total Billable Time: 35  minutes    Precautions:  Standard    -Pt is currently 8 weeks post op    SUBJECTIVE      Pt reports:  My hand is feeling stronger.  Tony somewhat compliant with home exercise program provided   Response to previous treatment: minimal soreness  Functional change: able to close the clutch on my motorcycle     Pain: 0/10 (resting pain); 3/10 stiffness   Location: left index and long digits      OBJECTIVE      · Tony received the following supervised modalities/therapeutic exercises after being cleared for contradictions for 8 minutes:   -Patient received fluidotherapy to left hand(s) for 8 minutes to increase  blood flow, circulation, desensitization, sensory re-education and for pain management.    · Tony participated in the following therapeutic exercises for 27 minutes including:  -brief scar massage of long finger dorsal surface  -long finger extension from table top x 20 reps  -resisted add/abd of long finger with yellow band x 10 reps each  -instrinsic roll with small dowel x 20 reps  -ultra gripper setting # 2 for 3 min   -3# wrist curl up bar x 5 reps  -green clip 3pt pinch x 2 minutes    Patient Education and Home Exercises       Education provided:   - Progress towards goals   -progress lifting cautiously  - cautious use to avoid injury to skin or joints  -regarding joint stiffness secondary to excess fluid in small joints     Written Home Exercises Provided: continue with previously instructed HEP   Exercises were reviewed and Tony was able to demonstrate them prior to the end of the session.  Tony demonstrated good  understanding of the HEP provided. See EMR under Patient Instructions for exercises provided 5/9/22 and 5/31/22.      Assessment      Tony has increased the functional use of his left hand. No pain with hand reported just some joint tightness. Good tolerance to additional resistive exercise.     Pt would continue to benefit from skilled OT.      Tony is progressing well towards his goals and there have been updates to goals at this time. Pt prognosis is Excellent.      Pt will continue to benefit from skilled Outpatient Occupational Therapy to address the deficits listed in the problem list on initial evaluation provide Pt/family education and to maximize Pt's level of independence in the home and community environment.      Pt's spiritual, cultural and educational needs considered and Pt agreeable to Plan of Care and goals.     Anticipated barriers to occupational therapy: none noted    ShortTerm Goals (to be met in 5 weeks or by 6/13/22):   1. Patient to be IND and compliant with HEP & attendance  for duration of therapy. In progress   2. Patient will demonstrate increased VALLECILLO in L index & long fingers within 15-20 degrees of his R hand to restore functional hand use for ADLs and IADLs. Partially Met 6/7  3. Assess L hand , pinch, and isolated muscle groups when appropriate and amend LTG if necessary. Goal Met 6/7  4. Assess fine motor dexterity using 9 Hole Peg Test and amend LTG. Goal Met 5/31  5. Patient will report no more than 4/10 pain in L hand. Goal Met 6/7  6. Patient will demonstrate decreased edema through his L index and long fingers by 0.5 cm. Goal Met 6/7     Long Term Goals (to be met in 10 weeks or by DC):   1. Patient to be IND and compliant with HEP & attendance for duration of therapy.  2. Patient will demonstrate increased VALLECILLO in L index & long fingers within 5-10 degrees of his R hand to restore functional hand use for ADLs and IADL.   3. Patient will demonstrate increased L hand  strength within 10 lbs. of his R hand to restore functional grasp for ADLs/work/leisure activities.   4. Patient will demonstrate increased L pinches (3 positions) within 2-3 lbs of his R hand to restore IND with fine motor activities.  5. Patient will report increase in functional use of his L hand by 25%-35% as evidenced by the FOTO outcome measure.   6. Patient will report no more than 2/10 pain in L hand.   7. Patient will demonstrate increased L hand dexterity as evidenced by the 9 Hole Peg Test by 3-5 seconds.      PLAN      Tony Soto is to be treated by Occupational Therapy 1-2 times per week for 10 weeks, or 10-20 visits, during the certification period from 5/9/22 to 7/25/22, to achieve the established goals.       Updates/Grading for next session: TBD pending progress       GATITO Mello

## 2022-06-16 ENCOUNTER — CLINICAL SUPPORT (OUTPATIENT)
Dept: REHABILITATION | Facility: HOSPITAL | Age: 27
End: 2022-06-16
Payer: COMMERCIAL

## 2022-06-16 DIAGNOSIS — Z78.9 IMPAIRED MOBILITY AND ADLS: ICD-10-CM

## 2022-06-16 DIAGNOSIS — R60.0 LOCALIZED EDEMA: ICD-10-CM

## 2022-06-16 DIAGNOSIS — M25.642 DECREASED RANGE OF MOTION OF FINGER OF LEFT HAND: Primary | ICD-10-CM

## 2022-06-16 DIAGNOSIS — R29.898 IMPAIRED DEXTERITY: ICD-10-CM

## 2022-06-16 DIAGNOSIS — M79.642 HAND PAIN, LEFT: ICD-10-CM

## 2022-06-16 DIAGNOSIS — L90.5 SCAR: ICD-10-CM

## 2022-06-16 DIAGNOSIS — Z78.9 IMPAIRED INSTRUMENTAL ACTIVITIES OF DAILY LIVING (IADL): ICD-10-CM

## 2022-06-16 DIAGNOSIS — R29.898 DECREASED GRIP STRENGTH OF LEFT HAND: ICD-10-CM

## 2022-06-16 DIAGNOSIS — Z74.09 IMPAIRED MOBILITY AND ADLS: ICD-10-CM

## 2022-06-16 PROCEDURE — 97022 WHIRLPOOL THERAPY: CPT | Mod: PN

## 2022-06-16 PROCEDURE — 97110 THERAPEUTIC EXERCISES: CPT | Mod: PN

## 2022-06-20 NOTE — PROGRESS NOTES
"[facility]  Occupational Therapy Daily Treatment Note     Date: 6/23/2022  Name: Tony Soto  Clinic Number: [id number]     Therapy Diagnosis:        Encounter Diagnoses   Name Primary?    Decreased range of motion of finger of left hand Yes    Localized edema      Scar      Decreased  strength of left hand      Impaired dexterity      Impaired mobility and ADLs      Impaired instrumental activities of daily living (IADL)      Hand pain, left        Physician: Anurag Keenan MD/ Dr. Jacque Medina     Physician Orders: Eval and Treat  Medical Diagnosis:   S68.119A (ICD-10-CM) - Amputation of tip of finger, initial encounter  S69.92XA (ICD-10-CM) - Hand injury, left, initial encounter  Surgical Procedure and Date:   -5/2/22 L reverse index/long cross-finger separation w/ flap graft   - 4/11/22 L long finger digital nerve repair w/ nerve conduit & extensor tendon repair w/ cross finger flap long to index, L index finger extensor tendon repair   Evaluation Date: 5/5/22  Insurance Authorization Period Expiration: 12/31/22  Plan of Care Expiration: 5/9/22 to 7/25/22  Date of Return to MD: 7/25/22  Progress Note Due: 7/7/22  FOTO: initial evaluation, 5th visit       Visit # / Visits authorized: 10/20 visits on POC (including evaluation) / 20 visit authorized   Time In: 10:45  Time Out: 11:25  Total Billable Time: 40 minutes    Precautions:  Standard    -Pt is currently 10 weeks post op    SUBJECTIVE      Pt reports: "My fingers are a little more stiff today than last time I was here, not painful, just stiff."      Tony was compliant with home exercise program given last session.     Response to previous treatment: favorable     Functional change: None reported since last session      Pain: 0/10 (resting pain); 1/10 when trying to attempt composite fist   Location: left index and long digits      OBJECTIVE        Pt received the following supervised modalities after being cleared " for contradictions for 10 minutes:   -Fluidotherapy to L hand for 10 minutes to increase blood flow, circulation, desensitization, sensory re-education and for pain management. Performed the following therex within fluidotherapy:  -AROM for digit flex/ext/ABD/ADD  -  strengthening w/ medium resistance foam ball        Pt received the following direct contact modalities after being cleared for contraindications for 8 minutes:  -Utrasound for scar tissue remodeling, increased circulation, & tissue elasticity @ 100% duty cycle, 3.3 Mhz, applied to L volar long finger, intensity = 0.6 w/cm2       Therapist performed the following manual therapy techniques to increase joint mobilization and soft tissue mobilization for 10 minutes:   -PROM for isolated MP/PIP/DIP joint flexion to increase joint mobility, ROM and for pain management.       · Tony participated in the following therapeutic exercises for 12 minutes (as part of HEP) including:  - PHG 50# for composite  x 6 min   - Weighted clothespin (blue) for lateral/key pinch & (green) for 3pt & 2pt pinches x 2-3 min each        Patient Education and Home Exercises       Education provided:   - Progress towards goals   - Importance of compliance w/ HEP      Written Home Exercises Provided: Continue with previous HEP.   Exercises were reviewed and Tony was able to demonstrate them prior to the end of the session.  Tony demonstrated good  understanding of the HEP provided. See EMR under Patient Instructions for exercises provided 5/9/22, 5/31/22, 6/7/22, and 6/21/22.      Assessment      Pt tolerated session very well, noting no index or long finger pain reported with increased resistive therex. Pt would continue to benefit from skilled OT.  Tony is progressing well towards his goals and there have been no updates to goals at this time. Pt prognosis is Excellent.      Pt will continue to benefit from skilled Outpatient Occupational Therapy to address the deficits  listed in the problem list on initial evaluation provide Pt/family education and to maximize Pt's level of independence in the home and community environment.      Pt's spiritual, cultural and educational needs considered and Pt agreeable to Plan of Care and goals.       Anticipated barriers to occupational therapy: none noted       ShortTerm Goals (to be met in 5 weeks or by 6/13/22):   1. Patient to be IND and compliant with HEP & attendance for duration of therapy. In progress   2. Patient will demonstrate increased VALLECILLO in L index & long fingers within 15-20 degrees of his R hand to restore functional hand use for ADLs and IADLs. Partially Met 6/7  3. Assess L hand , pinch, and isolated muscle groups when appropriate and amend LTG if necessary. Goal Met 6/7  4. Assess fine motor dexterity using 9 Hole Peg Test and amend LTG. Goal Met 5/31  5. Patient will report no more than 4/10 pain in L hand. Goal Met 6/7  6. Patient will demonstrate decreased edema through his L index and long fingers by 0.5 cm. Goal Met 6/7        Updated Long Term Goals (to be met in 10 weeks or by DC):   1. Patient to be IND and compliant with HEP & attendance for duration of therapy.  2. Patient will demonstrate increased VALLECILLO in L index & long fingers within 5-10 degrees of his R hand to restore functional hand use for ADLs and IADL.   3. Patient will demonstrate increased L hand  strength within 10 lbs. of his R hand to restore functional grasp for ADLs/work/leisure activities.   4. Patient will demonstrate increased L pinches (3 positions) within 2-3 lbs of his R hand to restore IND with fine motor activities.  5. Patient will report increase in functional use of his L hand by 25%-35% as evidenced by the FOTO outcome measure.   6. Patient will report no more than 2/10 pain in L hand.   7. Patient will demonstrate increased L hand dexterity as evidenced by the 9 Hole Peg Test by 3-5 seconds.   8. Patient will demonstrate  increased L index and long finger FDS/FDP strength to 5/5 to facilitate performance of work-related tasks.      PLAN      Tony Soto is to be treated by Occupational Therapy 1-2 times per week for 10 weeks, or 10-20 visits, during the certification period from 5/9/22 to 7/25/22, to achieve the established goals.       Updates/Grading for next session: TBD pending progress       GATITO Young MOT  [facility]   Phone: [phone]  Fax: [phone]

## 2022-06-20 NOTE — PROGRESS NOTES
"[facility]  Occupational Therapy Daily Treatment Note     Date: 6/21/2022  Name: Tony Soto  Clinic Number: [id number]     Therapy Diagnosis:        Encounter Diagnoses   Name Primary?    Decreased range of motion of finger of left hand Yes    Localized edema      Scar      Decreased  strength of left hand      Impaired dexterity      Impaired mobility and ADLs      Impaired instrumental activities of daily living (IADL)      Hand pain, left        Physician: Anurag Keenan MD/ Dr. Jacque Medina     Physician Orders: Eval and Treat  Medical Diagnosis:   S68.119A (ICD-10-CM) - Amputation of tip of finger, initial encounter  S69.92XA (ICD-10-CM) - Hand injury, left, initial encounter  Surgical Procedure and Date:   -5/2/22 L reverse index/long cross-finger separation w/ flap graft   - 4/11/22 L long finger digital nerve repair w/ nerve conduit & extensor tendon repair w/ cross finger flap long to index, L index finger extensor tendon repair   Evaluation Date: 5/5/22  Insurance Authorization Period Expiration: 12/31/22  Plan of Care Expiration: 5/9/22 to 7/25/22  Date of Return to MD: 6/13/22  Progress Note Due: 7/7/22  FOTO: initial evaluation, 5th visit       Visit # / Visits authorized: 9/20 visits on POC (including evaluation) / 20 visit authorized   Time In: 07:47  Time Out: 08:30  Total Billable Time: 43 minutes    Precautions:  Standard    -Pt is currently 10 weeks post op    SUBJECTIVE      Pt reports: "My hand feels so much less stiff- the skin is healing really well too."      Tony was compliant with home exercise program given last session.     Response to previous treatment: no adverse affects    Functional change: None reported      Pain: 0/10 (resting pain); 0/10 when trying to attempt composite fist   Location: left index and long digits      OBJECTIVE        Pt received the following supervised modalities after being cleared for contradictions for 10 minutes: "   -Fluidotherapy to L hand to increase blood flow, circulation, desensitization, sensory re-education and for pain management. Performed the following therex within fluidotherapy:  -Gentle  strengthening w/ medium resistance foam ball x 8 min        Pt received the following direct contact modalities after being cleared for contraindications for 8 minutes:  -Utrasound for scar tissue remodeling, increased circulation, & tissue elasticity @ 100% duty cycle, 3.3 Mhz, applied to L volar long finger, intensity = 0.6 w/cm2        · Tony participated in the following therapeutic exercises for 25 minutes (as part of HEP) including:  -Isolated PIP and DIP flexion of long & index fingers x 10 reps each  - Theraputty (green) for the following: composite , lateral/key pinch, 3pt pinch, and lumbrical pinch x 3 min each      Patient Education and Home Exercises       Education provided:   - Progress towards goals   - Importance of compliance w/ HEP      Written Home Exercises Provided: Yes. Green Theraputty provided. Pt encouraged to either mix the green with the red for a medium resistance, or upgrade to green for medium/high resistance pending no increased pain with all HEP.  Be mindful of edges of skin graft areas to check for any irritation or opening.   Exercises were reviewed and Tony was able to demonstrate them prior to the end of the session.  Tony demonstrated good  understanding of the HEP provided. See EMR under Patient Instructions for exercises provided 5/9/22, 5/31/22, and 6/7/22.      Assessment      Pt tolerated session well, noting no pain reported with upgraded therex. Pt would continue to benefit from skilled OT.  Tony is progressing well towards his goals and there have been updates to goals at this time. Pt prognosis is Excellent.      Pt will continue to benefit from skilled Outpatient Occupational Therapy to address the deficits listed in the problem list on initial evaluation provide Pt/family  education and to maximize Pt's level of independence in the home and community environment.      Pt's spiritual, cultural and educational needs considered and Pt agreeable to Plan of Care and goals.       Anticipated barriers to occupational therapy: none noted       ShortTerm Goals (to be met in 5 weeks or by 6/13/22):   1. Patient to be IND and compliant with HEP & attendance for duration of therapy. In progress   2. Patient will demonstrate increased VALLECILLO in L index & long fingers within 15-20 degrees of his R hand to restore functional hand use for ADLs and IADLs. Partially Met 6/7  3. Assess L hand , pinch, and isolated muscle groups when appropriate and amend LTG if necessary. Goal Met 6/7  4. Assess fine motor dexterity using 9 Hole Peg Test and amend LTG. Goal Met 5/31  5. Patient will report no more than 4/10 pain in L hand. Goal Met 6/7  6. Patient will demonstrate decreased edema through his L index and long fingers by 0.5 cm. Goal Met 6/7        Updated Long Term Goals (to be met in 10 weeks or by DC):   1. Patient to be IND and compliant with HEP & attendance for duration of therapy.  2. Patient will demonstrate increased VALLECILLO in L index & long fingers within 5-10 degrees of his R hand to restore functional hand use for ADLs and IADL.   3. Patient will demonstrate increased L hand  strength within 10 lbs. of his R hand to restore functional grasp for ADLs/work/leisure activities.   4. Patient will demonstrate increased L pinches (3 positions) within 2-3 lbs of his R hand to restore IND with fine motor activities.  5. Patient will report increase in functional use of his L hand by 25%-35% as evidenced by the FOTO outcome measure.   6. Patient will report no more than 2/10 pain in L hand.   7. Patient will demonstrate increased L hand dexterity as evidenced by the 9 Hole Peg Test by 3-5 seconds.   8. Patient will demonstrate increased L index and long finger FDS/FDP strength to 5/5 to facilitate  performance of work-related tasks.      PLAN      Tony Soto is to be treated by Occupational Therapy 1-2 times per week for 10 weeks, or 10-20 visits, during the certification period from 5/9/22 to 7/25/22, to achieve the established goals.       Updates/Grading for next session: TBD pending progress       GATITO Young MOT  [facility]   Phone: [phone]  Fax: [phone]

## 2022-06-21 ENCOUNTER — CLINICAL SUPPORT (OUTPATIENT)
Dept: REHABILITATION | Facility: HOSPITAL | Age: 27
End: 2022-06-21
Payer: COMMERCIAL

## 2022-06-21 DIAGNOSIS — R29.898 IMPAIRED DEXTERITY: ICD-10-CM

## 2022-06-21 DIAGNOSIS — Z74.09 IMPAIRED MOBILITY AND ADLS: ICD-10-CM

## 2022-06-21 DIAGNOSIS — L90.5 SCAR: ICD-10-CM

## 2022-06-21 DIAGNOSIS — Z78.9 IMPAIRED MOBILITY AND ADLS: ICD-10-CM

## 2022-06-21 DIAGNOSIS — R60.0 LOCALIZED EDEMA: ICD-10-CM

## 2022-06-21 DIAGNOSIS — R29.898 DECREASED GRIP STRENGTH OF LEFT HAND: ICD-10-CM

## 2022-06-21 DIAGNOSIS — Z78.9 IMPAIRED INSTRUMENTAL ACTIVITIES OF DAILY LIVING (IADL): ICD-10-CM

## 2022-06-21 DIAGNOSIS — S69.92XA HAND INJURY, LEFT, INITIAL ENCOUNTER: ICD-10-CM

## 2022-06-21 DIAGNOSIS — M79.642 HAND PAIN, LEFT: ICD-10-CM

## 2022-06-21 DIAGNOSIS — S68.119A AMPUTATION OF TIP OF FINGER, INITIAL ENCOUNTER: ICD-10-CM

## 2022-06-21 DIAGNOSIS — M25.642 DECREASED RANGE OF MOTION OF FINGER OF LEFT HAND: Primary | ICD-10-CM

## 2022-06-21 PROCEDURE — 97022 WHIRLPOOL THERAPY: CPT | Mod: PN

## 2022-06-21 PROCEDURE — 97110 THERAPEUTIC EXERCISES: CPT | Mod: PN

## 2022-06-21 PROCEDURE — 97035 APP MDLTY 1+ULTRASOUND EA 15: CPT | Mod: PN

## 2022-06-23 ENCOUNTER — CLINICAL SUPPORT (OUTPATIENT)
Dept: REHABILITATION | Facility: HOSPITAL | Age: 27
End: 2022-06-23
Payer: COMMERCIAL

## 2022-06-23 DIAGNOSIS — L90.5 SCAR: ICD-10-CM

## 2022-06-23 DIAGNOSIS — R29.898 IMPAIRED DEXTERITY: ICD-10-CM

## 2022-06-23 DIAGNOSIS — M79.642 HAND PAIN, LEFT: ICD-10-CM

## 2022-06-23 DIAGNOSIS — Z78.9 IMPAIRED INSTRUMENTAL ACTIVITIES OF DAILY LIVING (IADL): ICD-10-CM

## 2022-06-23 DIAGNOSIS — R29.898 DECREASED GRIP STRENGTH OF LEFT HAND: ICD-10-CM

## 2022-06-23 DIAGNOSIS — R60.0 LOCALIZED EDEMA: ICD-10-CM

## 2022-06-23 DIAGNOSIS — S68.119A AMPUTATION OF TIP OF FINGER, INITIAL ENCOUNTER: ICD-10-CM

## 2022-06-23 DIAGNOSIS — S69.92XA HAND INJURY, LEFT, INITIAL ENCOUNTER: Primary | ICD-10-CM

## 2022-06-23 DIAGNOSIS — M25.642 DECREASED RANGE OF MOTION OF FINGER OF LEFT HAND: ICD-10-CM

## 2022-06-23 PROCEDURE — 97022 WHIRLPOOL THERAPY: CPT | Mod: PN

## 2022-06-23 PROCEDURE — 97140 MANUAL THERAPY 1/> REGIONS: CPT | Mod: PN

## 2022-06-23 PROCEDURE — 97035 APP MDLTY 1+ULTRASOUND EA 15: CPT | Mod: PN

## 2022-06-23 PROCEDURE — 97110 THERAPEUTIC EXERCISES: CPT | Mod: PN

## 2022-06-27 NOTE — PROGRESS NOTES
"[facility]  Occupational Therapy Daily Treatment Note     Date: 6/28/2022  Name: Tony Soto  Clinic Number: [id number]     Therapy Diagnosis:        Encounter Diagnoses   Name Primary?    Decreased range of motion of finger of left hand Yes    Localized edema      Scar      Decreased  strength of left hand      Impaired dexterity      Impaired mobility and ADLs      Impaired instrumental activities of daily living (IADL)      Hand pain, left        Physician: Anurag Keenan MD/ Dr. Jacque Medina     Physician Orders: Eval and Treat  Medical Diagnosis:   S68.119A (ICD-10-CM) - Amputation of tip of finger, initial encounter  S69.92XA (ICD-10-CM) - Hand injury, left, initial encounter  Surgical Procedure and Date:   -5/2/22 L reverse index/long cross-finger separation w/ flap graft   - 4/11/22 L long finger digital nerve repair w/ nerve conduit & extensor tendon repair w/ cross finger flap long to index, L index finger extensor tendon repair   Evaluation Date: 5/5/22  Insurance Authorization Period Expiration: 12/31/22  Plan of Care Expiration: 5/9/22 to 7/25/22  Date of Return to MD: 7/25/22  Progress Note Due: 7/7/22  FOTO: initial evaluation, 5th visit       Visit # / Visits authorized: 11/20 visits on POC (including evaluation) / 20 visits authorized   Time In: 07:45  Time Out: 08:25  Total Billable Time: 40 minutes    Precautions:  Standard    -Pt is currently 11 weeks post op    SUBJECTIVE      Pt reports: "The feeling in both of my fingers are coming back slowly. Overall too- the pain is minimal."      Tony was compliant with home exercise program given last session.     Response to previous treatment: favorable     Functional change: None reported since last session      Pain: 0/10 (resting pain); 1/10 when trying to attempt composite fist   Location: left index and long digits      OBJECTIVE        Pt received the following supervised modalities after being cleared " for contradictions for 10 minutes:   -Fluidotherapy to L hand for 10 minutes to increase blood flow, circulation, desensitization, sensory re-education and for pain management. Performed the following therex within fluidotherapy:  -AROM for digit flex/ext/ABD/ADD  -  strengthening w/ medium resistance foam ball        Pt received the following direct contact modalities after being cleared for contraindications for 8 minutes:  -Utrasound for scar tissue remodeling, increased circulation, & tissue elasticity @ 100% duty cycle, 3.3 Mhz, applied to L volar long finger, intensity = 0.6 w/cm2      · Tony participated in the following therapeutic exercises for 22 minutes (as part of HEP) including:  - PHG 50# for composite , forearm neutral and pronated  x 10 min   - Weighted clothespin (blue) for lateral/key pinch, 3pt & 2pt pinches x 2-3 min each        Patient Education and Home Exercises       Education provided:   - Progress towards goals   - Importance of compliance w/ HEP      Written Home Exercises Provided: Continue with previous HEP.   Exercises were reviewed and Tony was able to demonstrate them prior to the end of the session.  Tony demonstrated good  understanding of the HEP provided. See EMR under Patient Instructions for exercises provided 5/9/22, 5/31/22, 6/7/22, and 6/21/22.      Assessment      Pt tolerated session very well, noting no pain reported w/ upgraded resistive 3pt & 2 pt pinch strengthening.  Pt would continue to benefit from skilled OT.  Tony is progressing well towards his goals and there have been no updates to goals at this time. Pt prognosis is Excellent.      Pt will continue to benefit from skilled Outpatient Occupational Therapy to address the deficits listed in the problem list on initial evaluation provide Pt/family education and to maximize Pt's level of independence in the home and community environment.      Pt's spiritual, cultural and educational needs considered and Pt  agreeable to Plan of Care and goals.       Anticipated barriers to occupational therapy: none noted       ShortTerm Goals (to be met in 5 weeks or by 6/13/22):   1. Patient to be IND and compliant with HEP & attendance for duration of therapy. In progress   2. Patient will demonstrate increased VALLECILLO in L index & long fingers within 15-20 degrees of his R hand to restore functional hand use for ADLs and IADLs. Partially Met 6/7  3. Assess L hand , pinch, and isolated muscle groups when appropriate and amend LTG if necessary. Goal Met 6/7  4. Assess fine motor dexterity using 9 Hole Peg Test and amend LTG. Goal Met 5/31  5. Patient will report no more than 4/10 pain in L hand. Goal Met 6/7  6. Patient will demonstrate decreased edema through his L index and long fingers by 0.5 cm. Goal Met 6/7        Updated Long Term Goals (to be met in 10 weeks or by DC):   1. Patient to be IND and compliant with HEP & attendance for duration of therapy.  2. Patient will demonstrate increased VALLECILLO in L index & long fingers within 5-10 degrees of his R hand to restore functional hand use for ADLs and IADL.   3. Patient will demonstrate increased L hand  strength within 10 lbs. of his R hand to restore functional grasp for ADLs/work/leisure activities.   4. Patient will demonstrate increased L pinches (3 positions) within 2-3 lbs of his R hand to restore IND with fine motor activities.  5. Patient will report increase in functional use of his L hand by 25%-35% as evidenced by the FOTO outcome measure.   6. Patient will report no more than 2/10 pain in L hand.   7. Patient will demonstrate increased L hand dexterity as evidenced by the 9 Hole Peg Test by 3-5 seconds.   8. Patient will demonstrate increased L index and long finger FDS/FDP strength to 5/5 to facilitate performance of work-related tasks.      PLAN      Tony Soto is to be treated by Occupational Therapy 1-2 times per week for 10 weeks, or 10-20 visits, during the  certification period from 5/9/22 to 7/25/22, to achieve the established goals.       Updates/Grading for next session: TBD pending progress       GATITO Young MOT  [facility] [place]   Phone: [phone]  Fax: [phone]

## 2022-06-28 ENCOUNTER — CLINICAL SUPPORT (OUTPATIENT)
Dept: REHABILITATION | Facility: HOSPITAL | Age: 27
End: 2022-06-28
Payer: COMMERCIAL

## 2022-06-28 DIAGNOSIS — R60.0 LOCALIZED EDEMA: ICD-10-CM

## 2022-06-28 DIAGNOSIS — S68.119A AMPUTATION OF TIP OF FINGER, INITIAL ENCOUNTER: Primary | ICD-10-CM

## 2022-06-28 DIAGNOSIS — M79.642 HAND PAIN, LEFT: ICD-10-CM

## 2022-06-28 DIAGNOSIS — Z78.9 IMPAIRED MOBILITY AND ADLS: ICD-10-CM

## 2022-06-28 DIAGNOSIS — M25.642 DECREASED RANGE OF MOTION OF FINGER OF LEFT HAND: ICD-10-CM

## 2022-06-28 DIAGNOSIS — L90.5 SCAR: ICD-10-CM

## 2022-06-28 DIAGNOSIS — Z78.9 IMPAIRED INSTRUMENTAL ACTIVITIES OF DAILY LIVING (IADL): ICD-10-CM

## 2022-06-28 DIAGNOSIS — S69.92XA HAND INJURY, LEFT, INITIAL ENCOUNTER: ICD-10-CM

## 2022-06-28 DIAGNOSIS — R29.898 IMPAIRED DEXTERITY: ICD-10-CM

## 2022-06-28 DIAGNOSIS — Z74.09 IMPAIRED MOBILITY AND ADLS: ICD-10-CM

## 2022-06-28 DIAGNOSIS — R29.898 DECREASED GRIP STRENGTH OF LEFT HAND: ICD-10-CM

## 2022-06-28 PROCEDURE — 97035 APP MDLTY 1+ULTRASOUND EA 15: CPT | Mod: PN

## 2022-06-28 PROCEDURE — 97022 WHIRLPOOL THERAPY: CPT | Mod: PN

## 2022-06-28 PROCEDURE — 97110 THERAPEUTIC EXERCISES: CPT | Mod: PN

## 2022-06-28 NOTE — PROGRESS NOTES
"[facility]  Occupational Therapy Daily Treatment Note     Date: 6/30/2022  Name: Tony Soto  Clinic Number: [id number]     Therapy Diagnosis:        Encounter Diagnoses   Name Primary?    Decreased range of motion of finger of left hand Yes    Localized edema      Scar      Decreased  strength of left hand      Impaired dexterity      Impaired mobility and ADLs      Impaired instrumental activities of daily living (IADL)      Hand pain, left        Physician: Anurag Keenan MD/ Dr. Jacque Medina     Physician Orders: Eval and Treat  Medical Diagnosis:   S68.119A (ICD-10-CM) - Amputation of tip of finger, initial encounter  S69.92XA (ICD-10-CM) - Hand injury, left, initial encounter  Surgical Procedure and Date:   -5/2/22 L reverse index/long cross-finger separation w/ flap graft   - 4/11/22 L long finger digital nerve repair w/ nerve conduit & extensor tendon repair w/ cross finger flap long to index, L index finger extensor tendon repair   Evaluation Date: 5/5/22  Insurance Authorization Period Expiration: 12/31/22  Plan of Care Expiration: 5/9/22 to 7/25/22  Date of Return to MD: 7/25/22  Progress Note Due: 7/7/22  FOTO: initial evaluation, 5th visit       Visit # / Visits authorized: 12/20 visits on POC (including evaluation) / 20 visits authorized   Time In: 10:45  Time Out: 11:25  Total Billable Time: 40 minutes    Precautions:  Standard    -Pt is currently 11 weeks post op    SUBJECTIVE      Pt reports: "My hand is feeling pretty normal today."      Tony was compliant with home exercise program given last session.     Response to previous treatment: favorable     Functional change: None reported since last session      Pain: 0/10 (resting pain); 0/10 when trying to attempt composite fist   Location: left index and long digits      OBJECTIVE        Pt received the following supervised modalities after being cleared for contradictions for 8 minutes:   -Fluidotherapy to " L hand for 8 minutes to increase blood flow, circulation, desensitization, sensory re-education and for pain management. Performed the following therex within fluidotherapy:  -AROM for digit flex/ext/ABD/ADD  -  strengthening w/ medium resistance foam ball        Pt received the following direct contact modalities after being cleared for contraindications for 8 minutes:  -Utrasound for scar tissue remodeling, increased circulation, & tissue elasticity @ 100% duty cycle, 3.3 Mhz, applied to L volar long finger, intensity = 0.6 w/cm2      · Tony participated in the following therapeutic exercises for 24 minutes   including:  - 2# free weight for wrist extension, flexion, RD, & UD x 1-2 min each   - PHG 50# for composite  x 5 min   - Weighted clothespin (blue) for lateral/key pinch & (green) for 3pt & 2pt pinches x 3 min each        Patient Education and Home Exercises       Education provided:   - Progress towards goals   - Importance of compliance w/ HEP      Written Home Exercises Provided: Continue with previous HEP.   Exercises were reviewed and Tony was able to demonstrate them prior to the end of the session.  Tony demonstrated good  understanding of the HEP provided. See EMR under Patient Instructions for exercises provided 5/9/22, 5/31/22, 6/7/22, and 6/21/22.      Assessment      Pt tolerated session very well, noting no reported pain with forceful grasp or composite flexion within the past week.  Pt would continue to benefit from skilled OT.  Tony is progressing well towards his goals and there have been no updates to goals at this time. Pt prognosis is Excellent.      Pt will continue to benefit from skilled Outpatient Occupational Therapy to address the deficits listed in the problem list on initial evaluation provide Pt/family education and to maximize Pt's level of independence in the home and community environment.      Pt's spiritual, cultural and educational needs considered and Pt agreeable  to Plan of Care and goals.       Anticipated barriers to occupational therapy: none noted       ShortTerm Goals (to be met in 5 weeks or by 6/13/22):   1. Patient to be IND and compliant with HEP & attendance for duration of therapy. In progress   2. Patient will demonstrate increased VALLECILLO in L index & long fingers within 15-20 degrees of his R hand to restore functional hand use for ADLs and IADLs. Partially Met 6/7  3. Assess L hand , pinch, and isolated muscle groups when appropriate and amend LTG if necessary. Goal Met 6/7  4. Assess fine motor dexterity using 9 Hole Peg Test and amend LTG. Goal Met 5/31  5. Patient will report no more than 4/10 pain in L hand. Goal Met 6/7  6. Patient will demonstrate decreased edema through his L index and long fingers by 0.5 cm. Goal Met 6/7        Updated Long Term Goals (to be met in 10 weeks or by DC):   1. Patient to be IND and compliant with HEP & attendance for duration of therapy.  2. Patient will demonstrate increased VALLECILLO in L index & long fingers within 5-10 degrees of his R hand to restore functional hand use for ADLs and IADL.   3. Patient will demonstrate increased L hand  strength within 10 lbs. of his R hand to restore functional grasp for ADLs/work/leisure activities.   4. Patient will demonstrate increased L pinches (3 positions) within 2-3 lbs of his R hand to restore IND with fine motor activities.  5. Patient will report increase in functional use of his L hand by 25%-35% as evidenced by the FOTO outcome measure.   6. Patient will report no more than 2/10 pain in L hand.   7. Patient will demonstrate increased L hand dexterity as evidenced by the 9 Hole Peg Test by 3-5 seconds.   8. Patient will demonstrate increased L index and long finger FDS/FDP strength to 5/5 to facilitate performance of work-related tasks.      PLAN      Tony Soto is to be treated by Occupational Therapy 1-2 times per week for 10 weeks, or 10-20 visits, during the  certification period from 5/9/22 to 7/25/22, to achieve the established goals.       Updates/Grading for next session: TBD pending progress       GATITO Young MOT  [facility] [place]   Phone: [phone]  Fax: [phone]

## 2022-06-30 ENCOUNTER — CLINICAL SUPPORT (OUTPATIENT)
Dept: REHABILITATION | Facility: HOSPITAL | Age: 27
End: 2022-06-30
Payer: COMMERCIAL

## 2022-06-30 DIAGNOSIS — Z78.9 IMPAIRED MOBILITY AND ADLS: ICD-10-CM

## 2022-06-30 DIAGNOSIS — L90.5 SCAR: ICD-10-CM

## 2022-06-30 DIAGNOSIS — R29.898 DECREASED GRIP STRENGTH OF LEFT HAND: ICD-10-CM

## 2022-06-30 DIAGNOSIS — Z74.09 IMPAIRED MOBILITY AND ADLS: ICD-10-CM

## 2022-06-30 DIAGNOSIS — M79.642 HAND PAIN, LEFT: ICD-10-CM

## 2022-06-30 DIAGNOSIS — R29.898 IMPAIRED DEXTERITY: ICD-10-CM

## 2022-06-30 DIAGNOSIS — Z78.9 IMPAIRED INSTRUMENTAL ACTIVITIES OF DAILY LIVING (IADL): ICD-10-CM

## 2022-06-30 DIAGNOSIS — R60.0 LOCALIZED EDEMA: ICD-10-CM

## 2022-06-30 DIAGNOSIS — M25.642 DECREASED RANGE OF MOTION OF FINGER OF LEFT HAND: Primary | ICD-10-CM

## 2022-06-30 PROCEDURE — 97110 THERAPEUTIC EXERCISES: CPT | Mod: PN

## 2022-06-30 PROCEDURE — 97035 APP MDLTY 1+ULTRASOUND EA 15: CPT | Mod: PN

## 2022-06-30 PROCEDURE — 97022 WHIRLPOOL THERAPY: CPT | Mod: PN

## 2022-06-30 NOTE — PROGRESS NOTES
"[facility]  Occupational Therapy Progress & Daily Treatment Note     Date: 7/7/2022  Name: Tony Soto  Clinic Number: [id number]     Therapy Diagnosis:        Encounter Diagnoses   Name Primary?    Decreased range of motion of finger of left hand Yes    Localized edema      Scar      Decreased  strength of left hand      Impaired dexterity      Impaired mobility and ADLs      Impaired instrumental activities of daily living (IADL)      Hand pain, left        Physician: Anurag Keenan MD/ Dr. Jacque Medina     Physician Orders: Eval and Treat  Medical Diagnosis:   S68.119A (ICD-10-CM) - Amputation of tip of finger, initial encounter  S69.92XA (ICD-10-CM) - Hand injury, left, initial encounter  Surgical Procedure and Date:   -5/2/22 L reverse index/long cross-finger separation w/ flap graft   - 4/11/22 L long finger digital nerve repair w/ nerve conduit & extensor tendon repair w/ cross finger flap long to index, L index finger extensor tendon repair   Evaluation Date: 5/5/22  Insurance Authorization Period Expiration: 12/31/22  Plan of Care Expiration: 5/9/22 to 7/25/22  Date of Return to MD: 7/25/22  Progress Note Due: 7/25/22  FOTO: initial evaluation, 5th visit       Visit # / Visits authorized: 13/20 visits on POC (including evaluation) / 20 visits authorized   Time In: 10:00  Time Out: 10:40  Total Billable Time: 40 minutes    Precautions:  Standard    -Pt is currently 12 weeks post op    SUBJECTIVE      Pt reports: "My finger is feeling pretty good today."      Tony was compliant with home exercise program given last session.     Response to previous treatment: favorable     Functional change: None reported since last session      Pain: 0/10 (resting pain); 0/10 when trying to attempt composite fist   Location: left index and long digits      OBJECTIVE        Brief re-assessment performed x 13 min     Range of Motion: right active  (Ext/Flex) Index Middle   MP " 0*/90*  (R:0*/80*) 0*/90*  (R:0*/90*)   PIP 0*/100*  (R: 0*/100*) 0*/100*  (R: 0*/100*)   DIP 0*/70*  (R: 0*/85*) 0*/80  (R: 0*/90*)   VALLECILLO 260*  (R: 265*) 270*  (R: 280*)         Manual Muscle Test:   Muscle Strength     Median Innervated:  FDP 1&2       4/5  FDS  1&2          4/5        Radial Nerve Innervated:  EI                4/5      Strength: (REFUGIO Dynamometer in lbs.) Average 3 trials, Position II  Right: 123 lbs  Left: 89 lbs (+25 lbs)      Pinch Strength: (Pinch Gauge in lbs), Average 3 trials  Lateral/Haas Pinch       L) 21.1 (+3.2)  R) 23.4  3pt Pinch                    L) 12.7 (+8)    R) 19.1  2pt Pinch                    L) 9.1 (+4.2)   R) 14.5        Pt received the following direct contact modalities after being cleared for contraindications for 8 minutes:  -Utrasound for scar tissue remodeling, increased circulation, & tissue elasticity @ 100% duty cycle, 3.3 Mhz, applied to L volar long finger, intensity = 0.6 w/cm2      · Tony participated in the following therapeutic exercises for 19 minutes (as part of HEP) including:  - 2# free weight for wrist extension, flexion, RD, & UD x 1-2 min each   - PHG 70# for composite  forearm neutral and pronated x 2 min each   - Weighted clothespin (blue) for lateral/key pinch & (green) for 3pt & 2pt pinches x 1 min each      Patient Education and Home Exercises       Education provided:   - Progress towards goals   - Importance of compliance w/ HEP      Written Home Exercises Provided: Continue with previous HEP. Blue Theraputty provided today to mix with Green.   Exercises were reviewed and Toyn was able to demonstrate them prior to the end of the session.  Tony demonstrated good  understanding of the HEP provided. See EMR under Patient Instructions for exercises provided 5/9/22, 5/31/22, 6/7/22, and 6/21/22.      Assessment      Pt continues to make excellent progress towards all established goals, noting increased  and isolated functional pinch  strength demonstrated since last RA, as well as improved sensation reported through dorsal long finger skin flap and decreased hypersensitivity reported as well.  Pt would continue to benefit from skilled OT.  Tony is progressing well towards his goals and there have been no updates to goals at this time. Pt prognosis is Excellent.      Pt will continue to benefit from skilled Outpatient Occupational Therapy to address the deficits listed in the problem list on initial evaluation provide Pt/family education and to maximize Pt's level of independence in the home and community environment.      Pt's spiritual, cultural and educational needs considered and Pt agreeable to Plan of Care and goals.       Anticipated barriers to occupational therapy: none noted       ShortTerm Goals (to be met in 5 weeks or by 6/13/22):   1. Patient to be IND and compliant with HEP & attendance for duration of therapy. In progress   2. Patient will demonstrate increased VALLECILLO in L index & long fingers within 15-20 degrees of his R hand to restore functional hand use for ADLs and IADLs. Partially Met 6/7  3. Assess L hand , pinch, and isolated muscle groups when appropriate and amend LTG if necessary. Goal Met 6/7  4. Assess fine motor dexterity using 9 Hole Peg Test and amend LTG. Goal Met 5/31  5. Patient will report no more than 4/10 pain in L hand. Goal Met 6/7  6. Patient will demonstrate decreased edema through his L index and long fingers by 0.5 cm. Goal Met 6/7        Updated Long Term Goals (to be met in 10 weeks or by DC):   1. Patient to be IND and compliant with HEP & attendance for duration of therapy.  2. Patient will demonstrate increased VALLECILLO in L index & long fingers within 5-10 degrees of his R hand to restore functional hand use for ADLs and IADL.   3. Patient will demonstrate increased L hand  strength within 10 lbs. of his R hand to restore functional grasp for ADLs/work/leisure activities.   4. Patient will  demonstrate increased L pinches (3 positions) within 2-3 lbs of his R hand to restore IND with fine motor activities.  5. Patient will report increase in functional use of his L hand by 25%-35% as evidenced by the FOTO outcome measure.   6. Patient will report no more than 2/10 pain in L hand.   7. Patient will demonstrate increased L hand dexterity as evidenced by the 9 Hole Peg Test by 3-5 seconds.   8. Patient will demonstrate increased L index and long finger FDS/FDP strength to 5/5 to facilitate performance of work-related tasks.      PLAN      Tony Soto is to be treated by Occupational Therapy 1-2 times per week for 10 weeks, or 10-20 visits, during the certification period from 5/9/22 to 7/25/22, to achieve the established goals.       Updates/Grading for next session: Re-Assessment       GATITO Young MOT  [facility]   Phone: [phone]  Fax: [phone]

## 2022-07-07 ENCOUNTER — CLINICAL SUPPORT (OUTPATIENT)
Dept: REHABILITATION | Facility: HOSPITAL | Age: 27
End: 2022-07-07
Payer: COMMERCIAL

## 2022-07-07 DIAGNOSIS — R29.898 DECREASED GRIP STRENGTH OF LEFT HAND: ICD-10-CM

## 2022-07-07 DIAGNOSIS — M79.642 HAND PAIN, LEFT: ICD-10-CM

## 2022-07-07 DIAGNOSIS — R60.0 LOCALIZED EDEMA: ICD-10-CM

## 2022-07-07 DIAGNOSIS — Z78.9 IMPAIRED MOBILITY AND ADLS: ICD-10-CM

## 2022-07-07 DIAGNOSIS — Z74.09 IMPAIRED MOBILITY AND ADLS: ICD-10-CM

## 2022-07-07 DIAGNOSIS — M25.642 DECREASED RANGE OF MOTION OF FINGER OF LEFT HAND: Primary | ICD-10-CM

## 2022-07-07 DIAGNOSIS — L90.5 SCAR: ICD-10-CM

## 2022-07-07 DIAGNOSIS — R29.898 IMPAIRED DEXTERITY: ICD-10-CM

## 2022-07-07 DIAGNOSIS — Z78.9 IMPAIRED INSTRUMENTAL ACTIVITIES OF DAILY LIVING (IADL): ICD-10-CM

## 2022-07-07 PROCEDURE — 97035 APP MDLTY 1+ULTRASOUND EA 15: CPT | Mod: PN

## 2022-07-07 PROCEDURE — 97530 THERAPEUTIC ACTIVITIES: CPT | Mod: PN

## 2022-07-07 PROCEDURE — 97110 THERAPEUTIC EXERCISES: CPT | Mod: PN

## 2022-07-11 NOTE — PROGRESS NOTES
"[facility]  Occupational Therapy Daily Treatment Note     Date: 7/12/2022  Name: Tony Soto  Clinic Number: [id number]     Therapy Diagnosis:        Encounter Diagnoses   Name Primary?    Decreased range of motion of finger of left hand Yes    Localized edema      Scar      Decreased  strength of left hand      Impaired dexterity      Impaired mobility and ADLs      Impaired instrumental activities of daily living (IADL)      Hand pain, left        Physician: Anurag Keenan MD/ Dr. Jacque Medina     Physician Orders: Eval and Treat  Medical Diagnosis:   S68.119A (ICD-10-CM) - Amputation of tip of finger, initial encounter  S69.92XA (ICD-10-CM) - Hand injury, left, initial encounter  Surgical Procedure and Date:   -5/2/22 L reverse index/long cross-finger separation w/ flap graft   - 4/11/22 L long finger digital nerve repair w/ nerve conduit & extensor tendon repair w/ cross finger flap long to index, L index finger extensor tendon repair   Evaluation Date: 5/5/22  Insurance Authorization Period Expiration: 12/31/22  Plan of Care Expiration: 5/9/22 to 7/25/22  Date of Return to MD: 7/25/22  Progress Note Due: 7/25/22  FOTO: initial evaluation, 5th visit       Visit # / Visits authorized: 14/20 visits on POC (including evaluation) / 20 visits authorized   Time In: 07:51  Time Out: 08:30  Total Billable Time: 39 minutes    Precautions:  Standard    -Pt is currently 12 weeks post op    SUBJECTIVE      Pt reports: "No issues with the hand."      Tony was compliant with home exercise program given last session.     Response to previous treatment: favorable     Functional change: None reported since last session      Pain: 0/10 (resting pain); 0/10 when trying to attempt composite fist   Location: left index and long digits        OBJECTIVE           Pt received the following direct contact modalities after being cleared for contraindications for 8 minutes:  -Utrasound for scar " tissue remodeling, increased circulation, & tissue elasticity @ 100% duty cycle, 3.3 Mhz, applied to L volar long finger, intensity = 0.6 w/cm2      · Tony participated in the following therapeutic exercises for 31 minutes (as part of HEP) including:  - 2# free weight for wrist extension, flexion, RD, & UD x 2 min each   - PHG 70# for composite  forearm neutral and pronated x 2 min each   - Weighted clothespin (blue) for lateral/key pinch & (blue) for 3pt & 2pt pinches x 2 min each      Patient Education and Home Exercises       Education provided:   - Progress towards goals   - Importance of compliance w/ HEP      Written Home Exercises Provided: Continue with previous HEP. Exercises were reviewed and Tony was able to demonstrate them prior to the end of the session.  Tony demonstrated good  understanding of the HEP provided. See EMR under Patient Instructions for exercises provided 5/9/22, 5/31/22, 6/7/22, and 6/21/22.      Assessment      Pt tolerated session well, noting continued favorable gains achieved in regards to restoration of normative /pinch strength.  Pt would continue to benefit from skilled OT.  Tony is progressing well towards his goals and there have been no updates to goals at this time. Pt prognosis is Excellent.      Pt will continue to benefit from skilled Outpatient Occupational Therapy to address the deficits listed in the problem list on initial evaluation provide Pt/family education and to maximize Pt's level of independence in the home and community environment.      Pt's spiritual, cultural and educational needs considered and Pt agreeable to Plan of Care and goals.       Anticipated barriers to occupational therapy: none noted       ShortTerm Goals (to be met in 5 weeks or by 6/13/22):   1. Patient to be IND and compliant with HEP & attendance for duration of therapy. In progress   2. Patient will demonstrate increased VALLECILLO in L index & long fingers within 15-20 degrees of his R  hand to restore functional hand use for ADLs and IADLs. Partially Met 6/7  3. Assess L hand , pinch, and isolated muscle groups when appropriate and amend LTG if necessary. Goal Met 6/7  4. Assess fine motor dexterity using 9 Hole Peg Test and amend LTG. Goal Met 5/31  5. Patient will report no more than 4/10 pain in L hand. Goal Met 6/7  6. Patient will demonstrate decreased edema through his L index and long fingers by 0.5 cm. Goal Met 6/7        Updated Long Term Goals (to be met in 10 weeks or by DC):   1. Patient to be IND and compliant with HEP & attendance for duration of therapy.  2. Patient will demonstrate increased VALLECILLO in L index & long fingers within 5-10 degrees of his R hand to restore functional hand use for ADLs and IADL.   3. Patient will demonstrate increased L hand  strength within 10 lbs. of his R hand to restore functional grasp for ADLs/work/leisure activities.   4. Patient will demonstrate increased L pinches (3 positions) within 2-3 lbs of his R hand to restore IND with fine motor activities.  5. Patient will report increase in functional use of his L hand by 25%-35% as evidenced by the FOTO outcome measure.   6. Patient will report no more than 2/10 pain in L hand.   7. Patient will demonstrate increased L hand dexterity as evidenced by the 9 Hole Peg Test by 3-5 seconds.   8. Patient will demonstrate increased L index and long finger FDS/FDP strength to 5/5 to facilitate performance of work-related tasks.      PLAN      Tony Soto is to be treated by Occupational Therapy 1-2 times per week for 10 weeks, or 10-20 visits, during the certification period from 5/9/22 to 7/25/22, to achieve the established goals.       Updates/Grading for next session: TBD pending progress       GATITO Young MOT  [facility]   Phone: [phone]  Fax: [phone]

## 2022-07-12 ENCOUNTER — CLINICAL SUPPORT (OUTPATIENT)
Dept: REHABILITATION | Facility: HOSPITAL | Age: 27
End: 2022-07-12
Payer: COMMERCIAL

## 2022-07-12 DIAGNOSIS — R29.898 DECREASED GRIP STRENGTH OF LEFT HAND: ICD-10-CM

## 2022-07-12 DIAGNOSIS — R29.898 IMPAIRED DEXTERITY: ICD-10-CM

## 2022-07-12 DIAGNOSIS — L90.5 SCAR: ICD-10-CM

## 2022-07-12 DIAGNOSIS — R60.0 LOCALIZED EDEMA: ICD-10-CM

## 2022-07-12 DIAGNOSIS — Z78.9 IMPAIRED MOBILITY AND ADLS: ICD-10-CM

## 2022-07-12 DIAGNOSIS — Z74.09 IMPAIRED MOBILITY AND ADLS: ICD-10-CM

## 2022-07-12 DIAGNOSIS — M25.642 DECREASED RANGE OF MOTION OF FINGER OF LEFT HAND: Primary | ICD-10-CM

## 2022-07-12 DIAGNOSIS — Z78.9 IMPAIRED INSTRUMENTAL ACTIVITIES OF DAILY LIVING (IADL): ICD-10-CM

## 2022-07-12 DIAGNOSIS — M79.642 HAND PAIN, LEFT: ICD-10-CM

## 2022-07-12 PROCEDURE — 97035 APP MDLTY 1+ULTRASOUND EA 15: CPT | Mod: PN

## 2022-07-12 PROCEDURE — 97110 THERAPEUTIC EXERCISES: CPT | Mod: PN

## 2022-07-19 ENCOUNTER — DOCUMENTATION ONLY (OUTPATIENT)
Dept: REHABILITATION | Facility: HOSPITAL | Age: 27
End: 2022-07-19

## 2022-07-19 DIAGNOSIS — Z78.9 IMPAIRED INSTRUMENTAL ACTIVITIES OF DAILY LIVING (IADL): ICD-10-CM

## 2022-07-19 DIAGNOSIS — R60.0 LOCALIZED EDEMA: ICD-10-CM

## 2022-07-19 DIAGNOSIS — Z74.09 IMPAIRED MOBILITY AND ADLS: ICD-10-CM

## 2022-07-19 DIAGNOSIS — Z78.9 IMPAIRED MOBILITY AND ADLS: ICD-10-CM

## 2022-07-19 DIAGNOSIS — M79.642 HAND PAIN, LEFT: ICD-10-CM

## 2022-07-19 DIAGNOSIS — R29.898 DECREASED GRIP STRENGTH OF LEFT HAND: ICD-10-CM

## 2022-07-19 DIAGNOSIS — R29.898 IMPAIRED DEXTERITY: ICD-10-CM

## 2022-07-19 DIAGNOSIS — L90.5 SCAR: ICD-10-CM

## 2022-07-19 DIAGNOSIS — M25.642 DECREASED RANGE OF MOTION OF FINGER OF LEFT HAND: Primary | ICD-10-CM

## 2022-07-19 NOTE — PROGRESS NOTES
"                                            Occupational Therapy Missed Visit     Name: Tony Soto  Date: 07/19/2022  Medical Diagnosis:   Encounter Diagnoses   Name Primary?    Decreased range of motion of finger of left hand Yes    Localized edema     Scar     Decreased  strength of left hand     Impaired dexterity     Impaired mobility and ADLs     Impaired instrumental activities of daily living (IADL)     Hand pain, left      Referring Physician: Anurag Keenan MD/ Dr. Jacque Medina    Patient (Pt) did not attend previously scheduled OT visit today. Pt cancelled his 7:45 a.m. visit 5 hours prior to scheduled time via My Chart citing "other" as reason. No charges have been posted today.     0 No show   1 Same day cancellations     GATITO Young MOT  [facility]   Phone: [phone]      "

## 2022-07-19 NOTE — PROGRESS NOTES
"[facility]  Occupational Therapy Discharge Summary      Date: 7/21/2022  Name: Tony Soto  Clinic Number: [id number]     Therapy Diagnosis:        Encounter Diagnoses   Name Primary?    Decreased range of motion of finger of left hand Yes    Localized edema      Scar      Decreased  strength of left hand      Impaired dexterity      Impaired mobility and ADLs      Impaired instrumental activities of daily living (IADL)      Hand pain, left        Physician: Anurag Keenan MD/ Dr. Jacque Medina     Physician Orders: Eval and Treat  Medical Diagnosis:   S68.119A (ICD-10-CM) - Amputation of tip of finger, initial encounter  S69.92XA (ICD-10-CM) - Hand injury, left, initial encounter  Surgical Procedure and Date:   -5/2/22 L reverse index/long cross-finger separation w/ flap graft   - 4/11/22 L long finger digital nerve repair w/ nerve conduit & extensor tendon repair w/ cross finger flap long to index, L index finger extensor tendon repair   Evaluation Date: 5/5/22  Insurance Authorization Period Expiration: 12/31/22  Plan of Care Expiration: 5/9/22 to 7/25/22  Date of Return to MD: 7/25/22  Progress Note Due: 7/25/22  FOTO: initial evaluation, 5th visit, 15th visit          Visit # / Visits authorized: 15/20 visits on POC (including evaluation) / 20 visits authorized   Time In: 10:25  Time Out: 10:45  Total Billable Time: 20 minutes    Precautions:  Standard    -Pt is currently 13 weeks post op    SUBJECTIVE      Pt reports: "My hand is totally better."      Tony was compliant with home exercise program given last session.     Response to previous treatment: favorable     Functional change: None reported since last session      Pain: 0/10 (resting pain); 0/10 when trying to attempt composite fist   Location: left index and long digits      OBJECTIVE      · Re-Assessment performed with measurements and report taken w/ goals updated x 20 min       Range of Motion: right " active  (Ext/Flex) Index Middle   MP 0*/85*  (R:0*/80*) 0*/90*  (R:0*/90*)   PIP 0*/100*  (R: 0*/100*) 0*/105*  (R: 0*/100*)   DIP 0*/75*  (R: 0*/85*) 0*/80*  (R: 0*/90*)   VALLECILLO 260*  (R: 265*) 275*  (R: 280*)         Manual Muscle Test:   Muscle Strength     Median Innervated:  FDP 1&2       5/5  FDS  1&2          5/5        Radial Nerve Innervated:  EI                5/5      Strength: (REFUGIO Dynamometer in lbs.) Average 3 trials, Position II  Right: 123 lbs  Left: 107 lbs (+43 lbs)      Pinch Strength: (Pinch Gauge in lbs), Average 3 trials  Lateral/Haas Pinch       L) 23.6    R) 23.4  3pt Pinch                    L) 16.9    R) 19.1  2pt Pinch                    L) 12      R) 14.5       Fine Harley Coordination Tests:   9 hole Peg Test          L)  25 seconds  R) 21 seconds        Outcome Measure: FOTO  CMS Impairment/Limitation/Restriction for FOTO  Upper Extremity Survey     Therapist reviewed FOTO scores for Tony Soto on 7/21/22.   FOTO documents entered into [employer] - see Media section.     Limitation Score: 2%   Category: Self Care          Patient Education and Home Exercises       Education provided:   - Progress towards goals      Written Home Exercises Provided: Continue with scar massage daily as previously instructed.     Assessment      Pt has met all LTG established for therapy noting normative digit VALLECILLO, , functional pinch, and isolated tendon strength demonstrated w/ no residual pain or functional limitations reported with daily household management or work-related tasks. Pt instructed to continue with scar massage daily to graft site to decreased mild adhesions present at lateral borders.     ShortTerm Goals (to be met in 5 weeks or by 6/13/22):   1. Patient to be IND and compliant with HEP & attendance for duration of therapy. In progress   2. Patient will demonstrate increased VALLECILLO in L index & long fingers within 15-20 degrees of his R hand to restore functional hand use for ADLs and IADLs.  Partially Met 6/7  3. Assess L hand , pinch, and isolated muscle groups when appropriate and amend LTG if necessary. Goal Met 6/7  4. Assess fine motor dexterity using 9 Hole Peg Test and amend LTG. Goal Met 5/31  5. Patient will report no more than 4/10 pain in L hand. Goal Met 6/7  6. Patient will demonstrate decreased edema through his L index and long fingers by 0.5 cm. Goal Met 6/7        Updated Long Term Goals (to be met in 10 weeks or by DC):   1. Patient to be IND and compliant with HEP & attendance for duration of therapy. Goal Met   2. Patient will demonstrate increased VALLECILLO in L index & long fingers within 5-10 degrees of his R hand to restore functional hand use for ADLs and IADL. Goal Met   3. Patient will demonstrate increased L hand  strength within 10 lbs. of his R hand to restore functional grasp for ADLs/work/leisure activities. Goal Met   4. Patient will demonstrate increased L pinches (3 positions) within 2-3 lbs of his R hand to restore IND with fine motor activities. Goal Met   5. Patient will report increase in functional use of his L hand by 25%-35% as evidenced by the FOTO outcome measure. Goal Met   6. Patient will report no more than 2/10 pain in L hand. Goal Met   7. Patient will demonstrate increased L hand dexterity as evidenced by the 9 Hole Peg Test by 3-5 seconds. Goal Met   8. Patient will demonstrate increased L index and long finger FDS/FDP strength to 5/5 to facilitate performance of work-related tasks. Goal Met      PLAN      Patient is appropriate for discharge from Outpatient Occupational Therapy at this time and will continue at home with HEP.       GATITO Young MOT  [facility]   Phone: [phone]  Fax: [phone]

## 2022-07-21 ENCOUNTER — CLINICAL SUPPORT (OUTPATIENT)
Dept: REHABILITATION | Facility: HOSPITAL | Age: 27
End: 2022-07-21
Payer: COMMERCIAL

## 2022-07-21 DIAGNOSIS — L90.5 SCAR: ICD-10-CM

## 2022-07-21 DIAGNOSIS — M79.642 HAND PAIN, LEFT: ICD-10-CM

## 2022-07-21 DIAGNOSIS — R29.898 DECREASED GRIP STRENGTH OF LEFT HAND: ICD-10-CM

## 2022-07-21 DIAGNOSIS — M25.642 DECREASED RANGE OF MOTION OF FINGER OF LEFT HAND: Primary | ICD-10-CM

## 2022-07-21 DIAGNOSIS — R29.898 IMPAIRED DEXTERITY: ICD-10-CM

## 2022-07-21 DIAGNOSIS — R60.0 LOCALIZED EDEMA: ICD-10-CM

## 2022-07-21 DIAGNOSIS — Z78.9 IMPAIRED INSTRUMENTAL ACTIVITIES OF DAILY LIVING (IADL): ICD-10-CM

## 2022-07-21 DIAGNOSIS — Z78.9 IMPAIRED MOBILITY AND ADLS: ICD-10-CM

## 2022-07-21 DIAGNOSIS — Z74.09 IMPAIRED MOBILITY AND ADLS: ICD-10-CM

## 2022-07-21 PROCEDURE — 97530 THERAPEUTIC ACTIVITIES: CPT | Mod: PN

## 2022-07-25 ENCOUNTER — TELEPHONE (OUTPATIENT)
Dept: ORTHOPEDICS | Facility: CLINIC | Age: 27
End: 2022-07-25

## 2022-07-25 ENCOUNTER — OFFICE VISIT (OUTPATIENT)
Dept: ORTHOPEDICS | Facility: CLINIC | Age: 27
End: 2022-07-25
Payer: COMMERCIAL

## 2022-07-25 VITALS — WEIGHT: 164 LBS | BODY MASS INDEX: 26.36 KG/M2 | HEIGHT: 66 IN

## 2022-07-25 DIAGNOSIS — Z98.890 POST-OPERATIVE STATE: Primary | ICD-10-CM

## 2022-07-25 PROCEDURE — 99024 PR POST-OP FOLLOW-UP VISIT: ICD-10-PCS | Mod: S$GLB,,, | Performed by: PHYSICIAN ASSISTANT

## 2022-07-25 PROCEDURE — 3008F PR BODY MASS INDEX (BMI) DOCUMENTED: ICD-10-PCS | Mod: CPTII,S$GLB,, | Performed by: PHYSICIAN ASSISTANT

## 2022-07-25 PROCEDURE — 3008F BODY MASS INDEX DOCD: CPT | Mod: CPTII,S$GLB,, | Performed by: PHYSICIAN ASSISTANT

## 2022-07-25 PROCEDURE — 99999 PR PBB SHADOW E&M-EST. PATIENT-LVL III: CPT | Mod: PBBFAC,,, | Performed by: PHYSICIAN ASSISTANT

## 2022-07-25 PROCEDURE — 99024 POSTOP FOLLOW-UP VISIT: CPT | Mod: S$GLB,,, | Performed by: PHYSICIAN ASSISTANT

## 2022-07-25 PROCEDURE — 99999 PR PBB SHADOW E&M-EST. PATIENT-LVL III: ICD-10-PCS | Mod: PBBFAC,,, | Performed by: PHYSICIAN ASSISTANT

## 2022-07-25 PROCEDURE — 1159F PR MEDICATION LIST DOCUMENTED IN MEDICAL RECORD: ICD-10-PCS | Mod: CPTII,S$GLB,, | Performed by: PHYSICIAN ASSISTANT

## 2022-07-25 PROCEDURE — 1159F MED LIST DOCD IN RCRD: CPT | Mod: CPTII,S$GLB,, | Performed by: PHYSICIAN ASSISTANT

## 2022-07-25 NOTE — PROGRESS NOTES
"Mr. Soto is here today for a post-operative visit. He is 12 weeks status post left index and long finger separation by Dr. Medina on 5/2/22.  He is status post left long digital nerve repair with nerve conduit, left long extensor tendon repair, and cross finger flap long to index by Dr. Medina on 4/11/21. He reports that he is doing well. He has been attending occupational therapy regularly. He has returned to all regular ADLs with no difficulty. Denies pain.  He denies fever, chills, and sweats since the time of the surgery.     Physical exam:    Vitals:    07/25/22 0922   Weight: 74.4 kg (164 lb)   Height: 5' 6" (1.676 m)   PainSc:   3     Vital signs are stable, patient is afebrile.  Patient is well dressed and well groomed, no acute distress.  Alert and oriented to person, place, and time.  Incision is well healed.  There is no erythema or exudate.  There is no sign of any infection. He is NVI. Full ROM.     Assessment:   status post left index and long finger separation by Dr. Medina on 5/2/22    status post left long digital nerve repair with nerve conduit, left long extensor tendon repair, and cross finger flap long to index by Dr. Medina on 4/11/21    Plan:  Tony was seen today for post-op evaluation and numbness.    Diagnoses and all orders for this visit:    Post-operative state      PO instruction reviewed and provided to patient  Continue OT/ HEP   RTC as needed          "

## 2022-09-16 ENCOUNTER — HOSPITAL ENCOUNTER (EMERGENCY)
Facility: HOSPITAL | Age: 27
Discharge: HOME | End: 2022-09-16
Attending: EMERGENCY MEDICINE
Payer: COMMERCIAL

## 2022-09-16 VITALS
SYSTOLIC BLOOD PRESSURE: 112 MMHG | WEIGHT: 163 LBS | RESPIRATION RATE: 16 BRPM | DIASTOLIC BLOOD PRESSURE: 64 MMHG | BODY MASS INDEX: 26.2 KG/M2 | HEIGHT: 66 IN | TEMPERATURE: 100 F | OXYGEN SATURATION: 99 % | HEART RATE: 70 BPM

## 2022-09-16 DIAGNOSIS — M25.572 LEFT ANKLE PAIN: ICD-10-CM

## 2022-09-16 DIAGNOSIS — S92.102A CLOSED DISPLACED FRACTURE OF LEFT TALUS, UNSPECIFIED PORTION OF TALUS, INITIAL ENCOUNTER: ICD-10-CM

## 2022-09-16 DIAGNOSIS — M25.472 EDEMA OF LEFT ANKLE: ICD-10-CM

## 2022-09-16 DIAGNOSIS — S82.65XA CLOSED NONDISPLACED FRACTURE OF LATERAL MALLEOLUS OF LEFT FIBULA, INITIAL ENCOUNTER: Primary | ICD-10-CM

## 2022-09-16 PROBLEM — S93.402A LEFT ANKLE SPRAIN: Status: ACTIVE | Noted: 2022-09-16

## 2022-09-16 PROCEDURE — 99284 EMERGENCY DEPT VISIT MOD MDM: CPT | Mod: ,,, | Performed by: PHYSICIAN ASSISTANT

## 2022-09-16 PROCEDURE — 99284 EMERGENCY DEPT VISIT MOD MDM: CPT

## 2022-09-16 PROCEDURE — 99284 PR EMERGENCY DEPT VISIT,LEVEL IV: ICD-10-PCS | Mod: ,,, | Performed by: PHYSICIAN ASSISTANT

## 2022-09-16 RX ORDER — HYDROCODONE BITARTRATE AND ACETAMINOPHEN 5; 325 MG/1; MG/1
1 TABLET ORAL EVERY 6 HOURS PRN
Qty: 12 TABLET | Refills: 0 | Status: SHIPPED | OUTPATIENT
Start: 2022-09-16

## 2022-09-16 RX ORDER — IBUPROFEN 800 MG/1
800 TABLET ORAL EVERY 6 HOURS PRN
Qty: 20 TABLET | Refills: 0 | Status: SHIPPED | OUTPATIENT
Start: 2022-09-16

## 2022-09-16 NOTE — ED NOTES
LOC: The patient is awake and alert; oriented x 3 and speaking appropriately.  APPEARANCE: Patient resting comfortably, patient is clean and well groomed  SKIN: warm and dry, normal skin turgor & moist mucus membranes, skin intact, no breakdown noted.  MUSCULOSKELETAL: Patient moving all extremities well, no obvious swelling or deformities noted, Pain and swelling in left ankle.   RESPIRATORY: Airway is open and patent, respirations are spontaneous, normal effort and rate  CARDIAC: Patient has a normal rate, no peripheral edema noted, capillary refill < 3 seconds; No complaints of chest pain   ABDOMEN: Soft and non tender to palpation, no distention noted.

## 2022-09-16 NOTE — ED TRIAGE NOTES
Left sprained ankle- atb 330p today  he was carrying a large tire and stepped down wrong. Swollen, no broken skin. 333

## 2022-09-16 NOTE — ED PROVIDER NOTES
Encounter Date: 9/16/2022       History     Chief Complaint   Patient presents with    Ankle Injury     Stepped down and twisted L ankle,      5:12 PM  Patient is a 27-year-old male who presents to [facility] ED with left ankle pain.  States around 1500 he was carrying a large wheel/tire by hand and fell about 2 feet onto his left foot/ankle.  He had acute pain at the time.  His current pain is 7/10 on the pain scale.  Denies any previous fracture injury to the foot.  He did not take any medications prior to arrival.  He has no other arthralgias.  He has no other complaints or concerns at this time.    Review of patient's allergies indicates:  No Known Allergies  No past medical history on file.  Past Surgical History:   Procedure Laterality Date    FLAP GRAFT SURGERY Left 4/11/2022    Procedure: FLAP GRAFT, left cross finger flap index finger;  Surgeon: Jacque Medina MD;  Location: [facility] OR;  Service: Orthopedics;  Laterality: Left;    FLAP GRAFT SURGERY Left 5/2/2022    Procedure: FLAP GRAFT,LEFT INDEX/LONG CROSS FINGER SEPARATION;  Surgeon: Jacque Medina MD;  Location: [facility] OR;  Service: Orthopedics;  Laterality: Left;    HARVESTING OF SKIN GRAFT Left 4/11/2022    Procedure: SURGICAL PROCUREMENT, GRAFT, SKIN;  Surgeon: Jacque Medina MD;  Location: [facility] OR;  Service: Orthopedics;  Laterality: Left;    HARVESTING OF SKIN GRAFT  4/11/2022    Procedure: ;  Surgeon: Jacque Medina MD;  Location: [facility] OR;  Service: Orthopedics;;    HARVESTING OF SKIN GRAFT  4/11/2022    Procedure: ;  Surgeon: Jacque Medina MD;  Location: [facility] OR;  Service: Orthopedics;;    REPAIR OF NERVE OF FINGER Left 4/11/2022    Procedure: REPAIR, NERVE, FINGER, left long finger;  Surgeon: Jacque Medina MD;  Location: [facility] OR;  Service: Orthopedics;  Laterality: Left;    TENOPLASTY OF HAND Left 4/11/2022    Procedure: REPAIR, TENDON, HAND, left long/index extensor tendon;  Surgeon: Jacque Medina MD;   Location: [facility] OR;  Service: Orthopedics;  Laterality: Left;     No family history on file.  Social History     Tobacco Use    Smoking status: Former     Types: Vaping with nicotine    Smokeless tobacco: Never   Substance Use Topics    Alcohol use: Yes     Comment: ocassionally    Drug use: Never     Review of Systems   Constitutional:  Negative for chills and fever.   HENT:  Negative for sore throat.    Respiratory:  Negative for shortness of breath.    Cardiovascular:  Negative for chest pain.   Gastrointestinal:  Negative for nausea.   Genitourinary:  Negative for dysuria.   Musculoskeletal:  Positive for arthralgias and myalgias. Negative for back pain.   Skin:  Negative for rash.   Neurological:  Negative for weakness.   Hematological:  Does not bruise/bleed easily.     Physical Exam     Initial Vitals [09/16/22 1559]   BP Pulse Resp Temp SpO2   117/86 62 18 100.2 °F (37.9 °C) 100 %      MAP       --         Physical Exam    Vitals reviewed.  Constitutional: He appears well-developed and well-nourished. He is not diaphoretic. He is cooperative.  Non-toxic appearance. He does not have a sickly appearance. He does not appear ill. No distress. Face mask in place.   HENT:   Head: Normocephalic and atraumatic.   Nose: Nose normal.   Mouth/Throat: No trismus in the jaw.   Eyes: Conjunctivae and EOM are normal.   Neck:   Normal range of motion.  Cardiovascular:  Normal rate.           Pulses:       Dorsalis pedis pulses are 2+ on the right side.   Pulmonary/Chest: No accessory muscle usage. No tachypnea. No respiratory distress.   Abdominal: He exhibits no distension.   Musculoskeletal:         General: Normal range of motion.      Cervical back: Normal range of motion.      Right ankle: Swelling present. Tenderness present over the lateral malleolus and medial malleolus. Normal range of motion.      Right foot: Normal range of motion. No bony tenderness.      Comments: Difficulty ambulating and bearing weight 2/2 to  pain.     Neurological: He is alert. He has normal strength.   Skin: Skin is dry. No pallor.       ED Course   Procedures  Labs Reviewed - No data to display         Imaging Results              X-Ray Tibia Fibula 2 View Left (Final result)  Result time 09/16/22 21:18:54      Final result by Francisco Marquez MD (09/16/22 21:18:54)                   Impression:      See above.      Electronically signed by: Francisco Marquez MD  Date:    09/16/2022  Time:    21:18               Narrative:    EXAMINATION:  XR TIBIA FIBULA 2 VIEW LEFT    CLINICAL HISTORY:  fx;    TECHNIQUE:  AP and lateral views of the left tibia and fibula were performed.    COMPARISON:  Ankle radiograph, 09/16/2022.    FINDINGS:  No acute displaced fracture in the proximal or mid tibia or fibula.  No dislocation.  There is a possible avulsion fracture of the lateral malleolus.  Soft tissue swelling along the lateral aspect of the ankle.  Ankle findings are better evaluated on same day ankle radiograph.                                        X-Ray Ankle Complete Left (Final result)  Result time 09/16/22 17:48:11      Final result by Omkar Green MD (09/16/22 17:48:11)                   Impression:      1.  Acute nondisplaced fracture of the tip of the lateral malleolus with associated soft tissue swelling.    2.  Suspected acute fracture of the medial aspect of the talus.  Suggest correlation with point tenderness within this region.    This report was flagged in Epic as abnormal.      Electronically signed by: Omkar Green MD  Date:    09/16/2022  Time:    17:48               Narrative:    EXAMINATION:  XR ANKLE COMPLETE 3 VIEW LEFT    CLINICAL HISTORY:  Pain in left ankle and joints of left foot    TECHNIQUE:  AP, lateral and oblique views of the left ankle were performed.    COMPARISON:  None    FINDINGS:  The bone mineralization is within normal limits.  There is cortical step-off involving the tip of the lateral malleolus.  There is also cortical  irregularity involving the medial aspect of the talus.  There is an adjacent 6 mm os ossific fragment within this region.    The joint spaces are maintained.  There is marked soft tissue most significant swelling overlying the lateral aspect of the left ankle.  No radiopaque foreign body is identified.                                       Medications - No data to display  Medical Decision Making:   History:   Old Medical Records: I decided to obtain old medical records.  Old Records Summarized: records from clinic visits.  Initial Assessment:   Patient is a 27-year-old male who presents to [facility] ED with left ankle pain.   Differential Diagnosis:   Includes but is not limited to sprain, fractures, dislocations, strain, contusion.  Clinical Tests:   Radiological Study: Reviewed and Ordered  ED Management:  Patient declines any medication here.  He has a bag of ice at bedside.  Will obtain x-ray and reassess.  I have Ace wrapped his left ankle. Will give crutches given inability to bear weight and ambulate.    X-ray shows acute nondisplaced fracture of the tip of the lateral malleolus. Possible fracture of the medial aspect of talus.            ED Course as of 09/17/22 1610   Fri Sep 16, 2022   1841 Case discussed with Ortho who will come evaluate and given recommendations.  [CL]      ED Course User Index  [CL] Steff Bauer PA-C          Ortho has seen patient. Refer to their notes.   They suspect a sprain. WBAT with boot and crutches. Would like to see in outpatient setting soon.  Follow up with Ortho.   Rx ibuprofen and norco for pain relief. Discussed etiology.  Ice. Elevate.  Return to the ER for new or worsening symptoms.      I have reviewed patient's chart and discussed this case with my supervising MD.     Steff Bauer PA-C  Emergent Department  [facility]  Spectralink #[id number] or #[id number]    Clinical Impression:   Final diagnoses:  [M25.572] Left ankle pain  [M25.472] Edema of left ankle  [S82.65XA]  Closed nondisplaced fracture of lateral malleolus of left fibula, initial encounter (Primary)  [S92.102A] Closed displaced fracture of left talus, unspecified portion of talus, initial encounter        ED Disposition Condition    Discharge Stable          ED Prescriptions       Medication Sig Dispense Start Date End Date Auth. Provider    HYDROcodone-acetaminophen (NORCO) 5-325 mg per tablet Take 1 tablet by mouth every 6 (six) hours as needed for Pain. 12 tablet 9/16/2022 -- Steff Bauer PA-C    ibuprofen (ADVIL,MOTRIN) 800 MG tablet Take 1 tablet (800 mg total) by mouth every 6 (six) hours as needed for Pain. 20 tablet 9/16/2022 -- Steff Bauer PA-C          Follow-up Information       Follow up With Specialties Details Why Contact Info Additional Information    [facility] Schedule an appointment as soon as possible for a visit   [address]  [facility] [address] [phone] [facility] - Main Building, 5th Floor Please park in [facility] and take Atrium elevator    [facility] - Emergency Dept Emergency Medicine  If symptoms worsen [address]  [facility] [address] [phone]              Steff Bauer PA-C  09/17/22 1610

## 2022-09-16 NOTE — Clinical Note
"Tony "Tony" Brittany was seen and treated in our emergency department on 9/16/2022.  He may return to work on 09/19/2022.  LIGHT DUTY UNTIL CLEARED BY ORTHOPEDICS     If you have any questions or concerns, please don't hesitate to call.      Steff Bauer PA-C"

## 2022-09-17 NOTE — ASSESSMENT & PLAN NOTE
Tony Soto is a 27 y.o. male with a left ankle sprain, closed, NVI.    - WBAT with boot and crutches  - Pain control per ED  - Follow up in clinic for WB X rays of LLE (ankle), pt will be contacted with appointment details

## 2022-09-17 NOTE — HPI
Tony Soto is a 27 y.o. male with no significant past medical history presenting with left ankle pain after a fall. He reports that he was carrying a tire and mis-stepped while walking and sustained an inversion injury to the left ankle. He reports pain circumferentially in the left ankle without radiation distally. Patient denies any head trauma or LOC. The patient denies prior hx of falls. Patient denies numbness and tingling. Denies any other musculoskeletal pain or injuries. No known history of prior left lower extremity injury or surgery.  Walks w/out assisted devices at baseline. Doesn't take any anticoagulation at baseline.  They denies IV drug use.   They denies tobacco use, but does report nicotine use (vape).   They deny alcohol use.   They deny immunosuppressant medications.  They deny chemotherapy.  They deny radiation therapy.   He works as a .

## 2022-09-17 NOTE — SUBJECTIVE & OBJECTIVE
No past medical history on file.    Past Surgical History:   Procedure Laterality Date    FLAP GRAFT SURGERY Left 4/11/2022    Procedure: FLAP GRAFT, left cross finger flap index finger;  Surgeon: Jacque Medina MD;  Location: [facility] OR;  Service: Orthopedics;  Laterality: Left;    FLAP GRAFT SURGERY Left 5/2/2022    Procedure: FLAP GRAFT,LEFT INDEX/LONG CROSS FINGER SEPARATION;  Surgeon: Jacque Medina MD;  Location: [facility] OR;  Service: Orthopedics;  Laterality: Left;    HARVESTING OF SKIN GRAFT Left 4/11/2022    Procedure: SURGICAL PROCUREMENT, GRAFT, SKIN;  Surgeon: Jacque Medina MD;  Location: [facility] OR;  Service: Orthopedics;  Laterality: Left;    HARVESTING OF SKIN GRAFT  4/11/2022    Procedure: ;  Surgeon: Jacque eMdina MD;  Location: [facility] OR;  Service: Orthopedics;;    HARVESTING OF SKIN GRAFT  4/11/2022    Procedure: ;  Surgeon: Jacque Medina MD;  Location: [facility] OR;  Service: Orthopedics;;    REPAIR OF NERVE OF FINGER Left 4/11/2022    Procedure: REPAIR, NERVE, FINGER, left long finger;  Surgeon: Jacque Medina MD;  Location: [facility] OR;  Service: Orthopedics;  Laterality: Left;    TENOPLASTY OF HAND Left 4/11/2022    Procedure: REPAIR, TENDON, HAND, left long/index extensor tendon;  Surgeon: Jacque Medina MD;  Location: [facility] OR;  Service: Orthopedics;  Laterality: Left;       Review of patient's allergies indicates:  No Known Allergies    No current facility-administered medications for this encounter.     Current Outpatient Medications   Medication Sig    HYDROcodone-acetaminophen (NORCO) 5-325 mg per tablet Take 1 tablet by mouth every 6 (six) hours as needed for Pain.    ibuprofen (ADVIL,MOTRIN) 800 MG tablet Take 1 tablet (800 mg total) by mouth every 6 (six) hours as needed for Pain.    ondansetron (ZOFRAN-ODT) 4 MG TbDL Take 1 tablet (4 mg total) by mouth every 6 (six) hours as needed. (Patient not taking: No sig reported)    pantoprazole (PROTONIX) 20 MG  "tablet Take 1 tablet (20 mg total) by mouth once daily.    traMADoL (ULTRAM) 50 mg tablet Take 1 tablet (50 mg total) by mouth every 6 (six) hours as needed for Pain. (Patient not taking: Reported on 7/25/2022)     Facility-Administered Medications Ordered in Other Encounters   Medication    LIDOcaine (PF) 10 mg/ml (1%) injection 10 mg     Family History    None       Tobacco Use    Smoking status: Former     Types: Vaping with nicotine    Smokeless tobacco: Never   Substance and Sexual Activity    Alcohol use: Yes     Comment: ocassionally    Drug use: Never    Sexual activity: Not on file     ROS  Constitutional: negative for fevers or chills  Eyes: negative visual changes or eye discharge  ENT: negative for ear pain or sore throat  Respiratory: negative for shortness of breath or cough  Cardiovascular: negative for chest pain or palpitations  Gastrointestinal: negative for abdominal pain, nausea, or vomiting  Genitourinary: negative for dysuria and flank pain  Neurological: negative for headaches or dizziness  Musculoskeletal: positive for left ankle pain    Objective:     Vital Signs (Most Recent):  Temp: 100.2 °F (37.9 °C) (09/16/22 1559)  Pulse: 70 (09/16/22 1832)  Resp: 16 (09/16/22 1832)  BP: 112/64 (09/16/22 1832)  SpO2: 99 % (09/16/22 1832) Vital Signs (24h Range):  Temp:  [100.2 °F (37.9 °C)] 100.2 °F (37.9 °C)  Pulse:  [62-70] 70  Resp:  [16-18] 16  SpO2:  [99 %-100 %] 99 %  BP: (112-117)/(64-86) 112/64     Weight: 73.9 kg (163 lb)  Height: 5' 6" (167.6 cm)  Body mass index is 26.31 kg/m².    No intake or output data in the 24 hours ending 09/16/22 2058    Ortho/SPM Exam  Gen:  No acute distress, well-developed, well nourished.  CV:  Peripherally well-perfused. 2+ radial pulses, symmetric.  Respiratory:  Normal respiratory effort. No accessory muscle use.   Head/Neck:  Normocephalic.  Atraumatic. Sclera anicteric. TM. Neck supple.  Neuro: CN 2-12 grossly intact. No FND. Awake. Alert. Oriented to person, " "place, time, and situation.        MSK:  Right Upper extremity  Inspection  - Skin intact throughout, no open wounds  - No swelling  - No ecchymosis, erythema, or signs of cellulitis  Palpation  - NonTTP throughout, no palpable abnormality  Range of motion  - AROM and PROM of the shoulder, elbow, wrist, and hand intact without pain  Stability  - No evidence of joint dislocation or abnormal laxity  Neurovascular  - AIN/PIN/Radial/Median/Ulnar Nerves assessed in isolation without deficit  - Able to give thumbs up, make "OK" sign, cross IF/LF, abduct/adduct fingers, make fist  - SILT throughout  - Compartments soft  - Radial artery palpated  - Capillary Refill <3s  - Muscle tone normal    Left Upper extremity  Inspection  - Skin intact throughout, no open wounds  - No swelling  - No ecchymosis, erythema, or signs of cellulitis  Palpation  - NonTTP throughout, no palpable abnormality  Range of motion  - AROM and PROM of the shoulder, elbow, wrist, and hand intact without pain  Stability  - No evidence of joint dislocation or abnormal laxity  Neurovascular  - AIN/PIN/Radial/Median/Ulnar Nerves assessed in isolation without deficit  - Able to give thumbs up, make "OK" sign, cross IF/LF, abduct/adduct fingers, make fist  - SILT throughout  - Compartments soft  - Radial artery palpated  - Capillary Refill <3s  - Muscle tone normal      Right Lower Extremity  Inspection  - Skin intact throughout, no open wounds  - No swelling  - No ecchymosis, erythema, or signs of cellulitis  Palpation  - NonTTP throughout, no palpable abnormality  Range of motion  - AROM and PROM of the hip, knee, ankle, and foot intact without pain  Stability  - No evidence of joint dislocation or abnormal laxity  Neurovascular  - TA/EHL/Gastroc/FHL assessed in isolation without deficit  - SILT throughout  - Compartments soft  - DP palpated   - Muscle tone normal    Left Lower Extremity  Inspection  - Skin intact throughout, no open wounds  - Edema " circumferentially about the ankle and ecchymosis to medial ankle, no plantar ecchymosis  - No erythema or signs of cellulitis  Palpation  - Appropriately TTP to the left ankle, no palpable abnormality  Range of motion  - AROM and PROM of the hip, knee, ankle, and foot intact, decreased ROM of left ankle due to pain but DF/PF, inversion, and eversion intact  Stability  - No evidence of joint dislocation or abnormal laxity  Neurovascular  - TA/EHL/Gastroc/FHL assessed in isolation without deficit  - SILT throughout  - Compartments soft  - DP palpated   - Muscle tone normal      Significant Labs: All pertinent labs within the past 24 hours have been reviewed.    Significant Imaging: X-Ray: I have reviewed all pertinent results/findings and my personal findings are:  avulsion of lateral malleolus signifying possible ligamentous injury and os trigonum vs medial facet fracture or talus, no proximal tibia or fibula fracture appreciated

## 2022-09-17 NOTE — DISCHARGE INSTRUCTIONS
06/10/19 1705   Final Note   Assessment Type Final Discharge Note   Anticipated Discharge Disposition Home       Pt discharged home on Saturday. Readmitted to PICU on Sunday. Pt will need EIP referral when discharged from PICU. Sw to notify jak.      Rocio Warren LCSW-[facility]  NICU   Ext. [phone]-phone  [email or web address]     Take ibuprofen 800 mg every 6 hours as needed with foods for anti-inflammatory relief.  You can take acetaminophen/tylenol 650 mg every 6 hours or 1000 mg every 8 hours for added relief.  Apply ice to the area for 10-20 minutes every 4 hours. You can apply heat 2 days after for the same duration and frequency.  Keep ankle/foot in boot. Use crutches. Do not bear weight on leg.  Follow up with Orthopedics for reevaluation.  Return to the ER for new or worsening symptoms.  Imaging Results               X-Ray Ankle Complete Left (Final result)  Result time 09/16/22 17:48:11      Final result by Omkar Green MD (09/16/22 17:48:11)                   Impression:      1.  Acute nondisplaced fracture of the tip of the lateral malleolus with associated soft tissue swelling.    2.  Suspected acute fracture of the medial aspect of the talus.  Suggest correlation with point tenderness within this region.    This report was flagged in Epic as abnormal.      Electronically signed by: Omkar Green MD  Date:    09/16/2022  Time:    17:48               Narrative:    EXAMINATION:  XR ANKLE COMPLETE 3 VIEW LEFT    CLINICAL HISTORY:  Pain in left ankle and joints of left foot    TECHNIQUE:  AP, lateral and oblique views of the left ankle were performed.    COMPARISON:  None    FINDINGS:  The bone mineralization is within normal limits.  There is cortical step-off involving the tip of the lateral malleolus.  There is also cortical irregularity involving the medial aspect of the talus.  There is an adjacent 6 mm os ossific fragment within this region.    The joint spaces are maintained.  There is marked soft tissue most significant swelling overlying the lateral aspect of the left ankle.  No radiopaque foreign body is identified.

## 2022-09-17 NOTE — CONSULTS
[facility] - Emergency Dept  Orthopedics  Consult Note    Patient Name: Tony Soto  MRN: [id number]  Admission Date: 9/16/2022  Hospital Length of Stay: 0 days  Attending Provider: Moises Acuna DO  Primary Care Provider: Mook Jenkins MD    Patient information was obtained from patient, spouse/SO, past medical records and ER records.     Inpatient consult to Orthopedic Surgery  Consult performed by: Kaiden Perkins MD  Consult ordered by: Steff Bauer PA-C        Subjective:     Principal Problem:Left ankle sprain    Chief Complaint:   Chief Complaint   Patient presents with    Ankle Injury     Stepped down and twisted L ankle,         HPI: Tony Soto is a 27 y.o. male with no significant past medical history presenting with left ankle pain after a fall. He reports that he was carrying a tire and mis-stepped while walking and sustained an inversion injury to the left ankle. He reports pain circumferentially in the left ankle without radiation distally. Patient denies any head trauma or LOC. The patient denies prior hx of falls. Patient denies numbness and tingling. Denies any other musculoskeletal pain or injuries. No known history of prior left lower extremity injury or surgery.  Walks w/out assisted devices at baseline. Doesn't take any anticoagulation at baseline.  They denies IV drug use.   They denies tobacco use, but does report nicotine use (vape).   They deny alcohol use.   They deny immunosuppressant medications.  They deny chemotherapy.  They deny radiation therapy.   He works as a .        No past medical history on file.    Past Surgical History:   Procedure Laterality Date    FLAP GRAFT SURGERY Left 4/11/2022    Procedure: FLAP GRAFT, left cross finger flap index finger;  Surgeon: Jacque Medina MD;  Location: [facility] OR;  Service: Orthopedics;  Laterality: Left;    FLAP GRAFT SURGERY Left 5/2/2022    Procedure: FLAP GRAFT,LEFT INDEX/LONG CROSS FINGER SEPARATION;  Surgeon: Jacque MARTIN  MD Carol;  Location: [facility] OR;  Service: Orthopedics;  Laterality: Left;    HARVESTING OF SKIN GRAFT Left 4/11/2022    Procedure: SURGICAL PROCUREMENT, GRAFT, SKIN;  Surgeon: Jacque Medina MD;  Location: [facility] OR;  Service: Orthopedics;  Laterality: Left;    HARVESTING OF SKIN GRAFT  4/11/2022    Procedure: ;  Surgeon: Jacque Medina MD;  Location: [facility] OR;  Service: Orthopedics;;    HARVESTING OF SKIN GRAFT  4/11/2022    Procedure: ;  Surgeon: Jacque Medina MD;  Location: [facility] OR;  Service: Orthopedics;;    REPAIR OF NERVE OF FINGER Left 4/11/2022    Procedure: REPAIR, NERVE, FINGER, left long finger;  Surgeon: Jacque Medina MD;  Location: [facility] OR;  Service: Orthopedics;  Laterality: Left;    TENOPLASTY OF HAND Left 4/11/2022    Procedure: REPAIR, TENDON, HAND, left long/index extensor tendon;  Surgeon: Jacque Medina MD;  Location: [facility] OR;  Service: Orthopedics;  Laterality: Left;       Review of patient's allergies indicates:  No Known Allergies    No current facility-administered medications for this encounter.     Current Outpatient Medications   Medication Sig    HYDROcodone-acetaminophen (NORCO) 5-325 mg per tablet Take 1 tablet by mouth every 6 (six) hours as needed for Pain.    ibuprofen (ADVIL,MOTRIN) 800 MG tablet Take 1 tablet (800 mg total) by mouth every 6 (six) hours as needed for Pain.    ondansetron (ZOFRAN-ODT) 4 MG TbDL Take 1 tablet (4 mg total) by mouth every 6 (six) hours as needed. (Patient not taking: No sig reported)    pantoprazole (PROTONIX) 20 MG tablet Take 1 tablet (20 mg total) by mouth once daily.    traMADoL (ULTRAM) 50 mg tablet Take 1 tablet (50 mg total) by mouth every 6 (six) hours as needed for Pain. (Patient not taking: Reported on 7/25/2022)     Facility-Administered Medications Ordered in Other Encounters   Medication    LIDOcaine (PF) 10 mg/ml (1%) injection 10 mg     Family History    None       Tobacco Use    Smoking  "status: Former     Types: Vaping with nicotine    Smokeless tobacco: Never   Substance and Sexual Activity    Alcohol use: Yes     Comment: ocassionally    Drug use: Never    Sexual activity: Not on file     ROS  Constitutional: negative for fevers or chills  Eyes: negative visual changes or eye discharge  ENT: negative for ear pain or sore throat  Respiratory: negative for shortness of breath or cough  Cardiovascular: negative for chest pain or palpitations  Gastrointestinal: negative for abdominal pain, nausea, or vomiting  Genitourinary: negative for dysuria and flank pain  Neurological: negative for headaches or dizziness  Musculoskeletal: positive for left ankle pain    Objective:     Vital Signs (Most Recent):  Temp: 100.2 °F (37.9 °C) (09/16/22 1559)  Pulse: 70 (09/16/22 1832)  Resp: 16 (09/16/22 1832)  BP: 112/64 (09/16/22 1832)  SpO2: 99 % (09/16/22 1832) Vital Signs (24h Range):  Temp:  [100.2 °F (37.9 °C)] 100.2 °F (37.9 °C)  Pulse:  [62-70] 70  Resp:  [16-18] 16  SpO2:  [99 %-100 %] 99 %  BP: (112-117)/(64-86) 112/64     Weight: 73.9 kg (163 lb)  Height: 5' 6" (167.6 cm)  Body mass index is 26.31 kg/m².    No intake or output data in the 24 hours ending 09/16/22 2058    Ortho/SPM Exam  Gen:  No acute distress, well-developed, well nourished.  CV:  Peripherally well-perfused. 2+ radial pulses, symmetric.  Respiratory:  Normal respiratory effort. No accessory muscle use.   Head/Neck:  Normocephalic.  Atraumatic. Sclera anicteric. TM. Neck supple.  Neuro: CN 2-12 grossly intact. No FND. Awake. Alert. Oriented to person, place, time, and situation.        MSK:  Right Upper extremity  Inspection  - Skin intact throughout, no open wounds  - No swelling  - No ecchymosis, erythema, or signs of cellulitis  Palpation  - NonTTP throughout, no palpable abnormality  Range of motion  - AROM and PROM of the shoulder, elbow, wrist, and hand intact without pain  Stability  - No evidence of joint dislocation or " "abnormal laxity  Neurovascular  - AIN/PIN/Radial/Median/Ulnar Nerves assessed in isolation without deficit  - Able to give thumbs up, make "OK" sign, cross IF/LF, abduct/adduct fingers, make fist  - SILT throughout  - Compartments soft  - Radial artery palpated  - Capillary Refill <3s  - Muscle tone normal    Left Upper extremity  Inspection  - Skin intact throughout, no open wounds  - No swelling  - No ecchymosis, erythema, or signs of cellulitis  Palpation  - NonTTP throughout, no palpable abnormality  Range of motion  - AROM and PROM of the shoulder, elbow, wrist, and hand intact without pain  Stability  - No evidence of joint dislocation or abnormal laxity  Neurovascular  - AIN/PIN/Radial/Median/Ulnar Nerves assessed in isolation without deficit  - Able to give thumbs up, make "OK" sign, cross IF/LF, abduct/adduct fingers, make fist  - SILT throughout  - Compartments soft  - Radial artery palpated  - Capillary Refill <3s  - Muscle tone normal      Right Lower Extremity  Inspection  - Skin intact throughout, no open wounds  - No swelling  - No ecchymosis, erythema, or signs of cellulitis  Palpation  - NonTTP throughout, no palpable abnormality  Range of motion  - AROM and PROM of the hip, knee, ankle, and foot intact without pain  Stability  - No evidence of joint dislocation or abnormal laxity  Neurovascular  - TA/EHL/Gastroc/FHL assessed in isolation without deficit  - SILT throughout  - Compartments soft  - DP palpated   - Muscle tone normal    Left Lower Extremity  Inspection  - Skin intact throughout, no open wounds  - Edema circumferentially about the ankle and ecchymosis to medial ankle, no plantar ecchymosis  - No erythema or signs of cellulitis  Palpation  - Appropriately TTP to the left ankle, no palpable abnormality  Range of motion  - AROM and PROM of the hip, knee, ankle, and foot intact, decreased ROM of left ankle due to pain but DF/PF, inversion, and eversion intact  Stability  - No evidence of " joint dislocation or abnormal laxity  Neurovascular  - TA/EHL/Gastroc/FHL assessed in isolation without deficit  - SILT throughout  - Compartments soft  - DP palpated   - Muscle tone normal      Significant Labs: All pertinent labs within the past 24 hours have been reviewed.    Significant Imaging: X-Ray: I have reviewed all pertinent results/findings and my personal findings are:  avulsion of lateral malleolus signifying possible ligamentous injury and os trigonum vs medial facet fracture or talus, no proximal tibia or fibula fracture appreciated    Assessment/Plan:     * Left ankle sprain  Tony Soto is a 27 y.o. male with a left ankle sprain, closed, NVI.    - WBAT with boot and crutches  - Pain control per ED  - Follow up in clinic for WB X rays of LLE (ankle), pt will be contacted with appointment details          Kaiden Perkins MD  Orthopedics  [facility] - Emergency Dept

## 2022-09-27 ENCOUNTER — HOSPITAL ENCOUNTER (OUTPATIENT)
Dept: RADIOLOGY | Facility: HOSPITAL | Age: 27
Discharge: HOME | End: 2022-09-27
Attending: PHYSICIAN ASSISTANT

## 2022-09-27 ENCOUNTER — OFFICE VISIT (OUTPATIENT)
Dept: ORTHOPEDICS | Facility: CLINIC | Age: 27
End: 2022-09-27

## 2022-09-27 VITALS
WEIGHT: 162.94 LBS | HEART RATE: 61 BPM | SYSTOLIC BLOOD PRESSURE: 120 MMHG | RESPIRATION RATE: 18 BRPM | DIASTOLIC BLOOD PRESSURE: 78 MMHG | BODY MASS INDEX: 26.19 KG/M2 | HEIGHT: 66 IN

## 2022-09-27 DIAGNOSIS — M79.672 LEFT FOOT PAIN: ICD-10-CM

## 2022-09-27 DIAGNOSIS — M25.572 ACUTE LEFT ANKLE PAIN: ICD-10-CM

## 2022-09-27 DIAGNOSIS — M25.572 ACUTE LEFT ANKLE PAIN: Primary | ICD-10-CM

## 2022-09-27 PROCEDURE — 99999 PR PBB SHADOW E&M-EST. PATIENT-LVL III: ICD-10-PCS | Mod: PBBFAC,,, | Performed by: PHYSICIAN ASSISTANT

## 2022-09-27 PROCEDURE — 99203 OFFICE O/P NEW LOW 30 MIN: CPT | Mod: S$PBB,,, | Performed by: PHYSICIAN ASSISTANT

## 2022-09-27 PROCEDURE — 73630 X-RAY EXAM OF FOOT: CPT | Mod: 26,LT,, | Performed by: RADIOLOGY

## 2022-09-27 PROCEDURE — 73630 XR FOOT COMPLETE 3 VIEW LEFT: ICD-10-PCS | Mod: 26,LT,, | Performed by: RADIOLOGY

## 2022-09-27 PROCEDURE — 99203 PR OFFICE/OUTPT VISIT, NEW, LEVL III, 30-44 MIN: ICD-10-PCS | Mod: S$PBB,,, | Performed by: PHYSICIAN ASSISTANT

## 2022-09-27 PROCEDURE — 73630 X-RAY EXAM OF FOOT: CPT | Mod: TC,LT

## 2022-09-27 PROCEDURE — 73610 XR ANKLE COMPLETE 3 VIEW LEFT: ICD-10-PCS | Mod: 26,LT,, | Performed by: RADIOLOGY

## 2022-09-27 PROCEDURE — 73610 X-RAY EXAM OF ANKLE: CPT | Mod: TC,LT

## 2022-09-27 PROCEDURE — 99999 PR PBB SHADOW E&M-EST. PATIENT-LVL III: CPT | Mod: PBBFAC,,, | Performed by: PHYSICIAN ASSISTANT

## 2022-09-27 PROCEDURE — 73610 X-RAY EXAM OF ANKLE: CPT | Mod: 26,LT,, | Performed by: RADIOLOGY

## 2022-09-27 PROCEDURE — 99213 OFFICE O/P EST LOW 20 MIN: CPT | Mod: PBBFAC | Performed by: PHYSICIAN ASSISTANT

## 2022-09-30 NOTE — PROGRESS NOTES
Subjective:      Patient ID: Tony Soto is a 27 y.o. male.    Chief Complaint: Pain of the Left Ankle    HPI    Patient is a 27 year old male who presented to the ED on 09/16/22 with left ankle pain after sis-stepping and sustaining an inversion injury.   RADS: avulsion of lateral malleolus signifying possible ligamentous injury and os trigonum vs medial facet fracture or talus, no proximal tibia or fibula fracture appreciated  Patient has been treated in a boot. He is using crutches to assist with ambulation. He stated that the swelling has gone down a lot and he is dogin better. No numbness or tingling.     Review of Systems   Constitutional: Negative for chills and fever.   Cardiovascular:  Negative for chest pain.   Respiratory:  Negative for cough and shortness of breath.    Skin:  Negative for color change, dry skin, itching, nail changes, poor wound healing and rash.   Musculoskeletal:         Left ankle sprain   Neurological:  Negative for dizziness.   Psychiatric/Behavioral:  Negative for altered mental status. The patient is not nervous/anxious.    All other systems reviewed and are negative.      Objective:      Ortho/SPM Exam    Left Lower Extremity  Inspection  - Skin intact throughout, no open wounds  - Edema circumferentially about the ankle and ecchymosis to medial ankle, no plantar ecchymosis  - No erythema or signs of cellulitis  Palpation  - Appropriately TTP to the left ankle, no palpable abnormality  Range of motion  - AROM and PROM of the hip, knee, ankle, and foot intact, decreased ROM of left ankle due to pain but DF/PF, inversion, and eversion intact  Stability  - No evidence of joint dislocation or abnormal laxity  Neurovascular  - TA/EHL/Gastroc/FHL assessed in isolation without deficit  - SILT throughout  - Compartments soft  - DP palpated   - Muscle tone normal     RADS:   Nondisplaced fracture fragment or less likely ossicle of medial aspect of the talus, unchanged.     Healing  fracture of the lateral malleolus, previously seen step-off of the tip of the fibula and tiny adjacent ossicle is not as well demonstrated on today's study.      Assessment:       Encounter Diagnoses   Name Primary?    Acute left ankle pain Yes    Left foot pain           Plan:       Over all he is doing better. He is to continue to boot with ambulation. Ok to remove when resting. ROMAT. Weight bearing as tolerated in boot. RICE to reduce pain and swelling. He is to return to clinic in 4 weeks at that time x-ray of his ankle is needed standing OOB.

## 2023-03-28 ENCOUNTER — PATIENT MESSAGE (OUTPATIENT)
Dept: RESEARCH | Facility: HOSPITAL | Age: 28
End: 2023-03-28

## 2025-02-05 NOTE — PATIENT INSTRUCTIONS
Patient instructed on the following:  - Scar massage to L volar long finger 2 x daily, 5 min each, to decrease adhesions and facilitate PIP & DIP flexion   
What Type Of Note Output Would You Prefer (Optional)?: Standard Output
Hpi Title: Evaluation of Skin Lesions

## 2025-05-05 ENCOUNTER — HOSPITAL ENCOUNTER (EMERGENCY)
Facility: HOSPITAL | Age: 30
Discharge: HOME | End: 2025-05-05
Attending: EMERGENCY MEDICINE
Payer: COMMERCIAL

## 2025-05-05 VITALS
HEART RATE: 75 BPM | TEMPERATURE: 98 F | OXYGEN SATURATION: 99 % | RESPIRATION RATE: 17 BRPM | WEIGHT: 185 LBS | DIASTOLIC BLOOD PRESSURE: 96 MMHG | HEIGHT: 67 IN | SYSTOLIC BLOOD PRESSURE: 142 MMHG | BODY MASS INDEX: 29.03 KG/M2

## 2025-05-05 DIAGNOSIS — R07.81 RIB PAIN ON LEFT SIDE: ICD-10-CM

## 2025-05-05 DIAGNOSIS — M79.18 MUSCULOSKELETAL PAIN: ICD-10-CM

## 2025-05-05 DIAGNOSIS — J20.9 ACUTE BRONCHITIS, UNSPECIFIED ORGANISM: Primary | ICD-10-CM

## 2025-05-05 PROCEDURE — 25000242 PHARM REV CODE 250 ALT 637 W/ HCPCS: Mod: ER | Performed by: EMERGENCY MEDICINE

## 2025-05-05 PROCEDURE — 94640 AIRWAY INHALATION TREATMENT: CPT | Mod: ER

## 2025-05-05 PROCEDURE — 25000003 PHARM REV CODE 250: Mod: ER | Performed by: EMERGENCY MEDICINE

## 2025-05-05 PROCEDURE — 94761 N-INVAS EAR/PLS OXIMETRY MLT: CPT | Mod: ER

## 2025-05-05 PROCEDURE — 99284 EMERGENCY DEPT VISIT MOD MDM: CPT | Mod: 25,ER

## 2025-05-05 RX ORDER — LEVOCETIRIZINE DIHYDROCHLORIDE 5 MG/1
5 TABLET, FILM COATED ORAL NIGHTLY
Qty: 30 TABLET | Refills: 0 | Status: SHIPPED | OUTPATIENT
Start: 2025-05-05 | End: 2026-05-05

## 2025-05-05 RX ORDER — IPRATROPIUM BROMIDE AND ALBUTEROL SULFATE 2.5; .5 MG/3ML; MG/3ML
3 SOLUTION RESPIRATORY (INHALATION) ONCE
Status: COMPLETED | OUTPATIENT
Start: 2025-05-05 | End: 2025-05-05

## 2025-05-05 RX ORDER — FLUTICASONE PROPIONATE 50 MCG
1 SPRAY, SUSPENSION (ML) NASAL 2 TIMES DAILY
Qty: 16 G | Refills: 0 | Status: SHIPPED | OUTPATIENT
Start: 2025-05-05

## 2025-05-05 RX ORDER — DICLOFENAC SODIUM 10 MG/G
2 GEL TOPICAL 4 TIMES DAILY PRN
Qty: 200 G | Refills: 0 | Status: SHIPPED | OUTPATIENT
Start: 2025-05-05

## 2025-05-05 RX ORDER — BENZONATATE 200 MG/1
200 CAPSULE ORAL 3 TIMES DAILY PRN
Qty: 30 CAPSULE | Refills: 0 | Status: SHIPPED | OUTPATIENT
Start: 2025-05-05 | End: 2025-05-15

## 2025-05-05 RX ORDER — IBUPROFEN 600 MG/1
600 TABLET ORAL EVERY 6 HOURS PRN
Qty: 20 TABLET | Refills: 0 | Status: SHIPPED | OUTPATIENT
Start: 2025-05-05

## 2025-05-05 RX ORDER — BENZONATATE 100 MG/1
200 CAPSULE ORAL
Status: COMPLETED | OUTPATIENT
Start: 2025-05-05 | End: 2025-05-05

## 2025-05-05 RX ORDER — METHOCARBAMOL 500 MG/1
1000 TABLET, FILM COATED ORAL 3 TIMES DAILY
Qty: 30 TABLET | Refills: 0 | Status: SHIPPED | OUTPATIENT
Start: 2025-05-05 | End: 2025-05-10

## 2025-05-05 RX ORDER — ALBUTEROL SULFATE 90 UG/1
2 INHALANT RESPIRATORY (INHALATION) EVERY 6 HOURS PRN
Qty: 18 G | Refills: 0 | Status: SHIPPED | OUTPATIENT
Start: 2025-05-05 | End: 2026-05-05

## 2025-05-05 RX ORDER — ACETAMINOPHEN 500 MG
500 TABLET ORAL EVERY 6 HOURS PRN
Qty: 30 TABLET | Refills: 0 | Status: SHIPPED | OUTPATIENT
Start: 2025-05-05

## 2025-05-05 RX ORDER — KETOROLAC TROMETHAMINE 10 MG/1
10 TABLET, FILM COATED ORAL
Status: COMPLETED | OUTPATIENT
Start: 2025-05-05 | End: 2025-05-05

## 2025-05-05 RX ORDER — AZITHROMYCIN 250 MG/1
250 TABLET, FILM COATED ORAL DAILY
Qty: 6 TABLET | Refills: 0 | Status: SHIPPED | OUTPATIENT
Start: 2025-05-05

## 2025-05-05 RX ADMIN — BENZONATATE 200 MG: 100 CAPSULE ORAL at 02:05

## 2025-05-05 RX ADMIN — KETOROLAC TROMETHAMINE 10 MG: 10 TABLET, FILM COATED ORAL at 02:05

## 2025-05-05 RX ADMIN — IPRATROPIUM BROMIDE AND ALBUTEROL SULFATE 3 ML: .5; 3 SOLUTION RESPIRATORY (INHALATION) at 02:05

## 2025-05-05 NOTE — ED PROVIDER NOTES
"Encounter Date: 5/5/2025    SCRIBE #1 NOTE: I, Diana Rodriguez, am scribing for, and in the presence of,  Shima Rome DO. I have scribed the following portions of the note - Other sections scribed: HPI, ROS, PE, MDM.   SCRIBE #2 NOTE: I, Vandana Snyder, am scribing for, and in the presence of,  Shima Rome DO. I have scribed the remaining portions of the note not scribed by Scribe #1. Other sections scribed: HPI, ROS, PE.     History     Chief Complaint   Patient presents with    Rib Injury     Pt presents w/ a c/o of left rib pain for approximately two days. Report heavy lifting, and possibly injured himself. Denies any direct trauma to the ribs. Painful w/ inspiration.     Tony Soto is a 29 y.o. male, with no pertinent PMHx, who presents to the ED with a chief complaint of a left side rib injury sustained 2 days ago. Patient reports he was lifting something heavy at work and since then, has exacerbated with coughing. Pt reports it now feels like he "broke" his rib. Pt reports a productive cough with yellow sputum and wheezing for 3.5 weeks. Pt has attempted self Tx with his son's albuterol nebulizer machine with some relief. No other exacerbating or alleviating factors. Denies chest pain, SOB, or other associated symptoms.     The history is provided by the patient. No  was used.     Review of patient's allergies indicates:  No Known Allergies  History reviewed. No pertinent past medical history.  Past Surgical History:   Procedure Laterality Date    FLAP GRAFT SURGERY Left 4/11/2022    Procedure: FLAP GRAFT, left cross finger flap index finger;  Surgeon: Jacque Medina MD;  Location: [facility] OR;  Service: Orthopedics;  Laterality: Left;    FLAP GRAFT SURGERY Left 5/2/2022    Procedure: FLAP GRAFT,LEFT INDEX/LONG CROSS FINGER SEPARATION;  Surgeon: Jacque Medina MD;  Location: [facility] OR;  Service: Orthopedics;  Laterality: Left;    HARVESTING OF SKIN GRAFT Left 4/11/2022    " Procedure: SURGICAL PROCUREMENT, GRAFT, SKIN;  Surgeon: Jacque Medina MD;  Location: [facility] OR;  Service: Orthopedics;  Laterality: Left;    HARVESTING OF SKIN GRAFT  4/11/2022    Procedure: ;  Surgeon: Jacque Medina MD;  Location: [facility] OR;  Service: Orthopedics;;    HARVESTING OF SKIN GRAFT  4/11/2022    Procedure: ;  Surgeon: Jacque Medina MD;  Location: [facility] OR;  Service: Orthopedics;;    REPAIR OF NERVE OF FINGER Left 4/11/2022    Procedure: REPAIR, NERVE, FINGER, left long finger;  Surgeon: Jacque Medina MD;  Location: [facility] OR;  Service: Orthopedics;  Laterality: Left;    TENOPLASTY OF HAND Left 4/11/2022    Procedure: REPAIR, TENDON, HAND, left long/index extensor tendon;  Surgeon: Jacque Medina MD;  Location: [facility] OR;  Service: Orthopedics;  Laterality: Left;     No family history on file.  Social History[1]  Review of Systems   Constitutional:  Negative for fever.   HENT:  Negative for rhinorrhea and sore throat.    Eyes:  Negative for pain.   Respiratory:  Positive for cough and wheezing. Negative for shortness of breath.    Cardiovascular:  Negative for chest pain and leg swelling.   Gastrointestinal:  Negative for abdominal pain.   Genitourinary:  Negative for dysuria.   Musculoskeletal:  Positive for arthralgias (+left side rib arthralgias).   Skin:  Negative for rash.   Neurological:  Negative for numbness.   All other systems reviewed and are negative.      Physical Exam     Initial Vitals [05/05/25 1248]   BP Pulse Resp Temp SpO2   (!) 149/95 71 20 98.4 °F (36.9 °C) 99 %      MAP       --         Physical Exam    Constitutional: Vital signs are normal. He appears well-developed and well-nourished.     Patient gave consent to have physical exam performed.     HENT:   Head: Normocephalic and atraumatic.   Right Ear: External ear normal.   Left Ear: External ear normal.   Nose: Mucosal edema and rhinorrhea present. Mouth/Throat: Oropharynx is clear and moist.   There  is post nasal drip present.   Eyes: Conjunctivae are normal.   Neck: Neck supple.   Normal range of motion.  Cardiovascular:  Normal rate, regular rhythm, normal heart sounds and normal pulses.     Exam reveals no gallop and no friction rub.       No murmur heard.  Pulmonary/Chest: Effort normal. He has wheezes (expiratory). He exhibits tenderness (L-sided).   Musculoskeletal:      Cervical back: Normal range of motion and neck supple.     Neurological: He is alert and oriented to person, place, and time.   Skin: Skin is warm and dry. Capillary refill takes less than 2 seconds. No rash noted.   Psychiatric: He has a normal mood and affect.         ED Course   Procedures  Labs Reviewed - No data to display       Imaging Results              X-Ray Ribs 2 View Left (Final result)  Result time 05/05/25 14:08:56      Final result by Chon Dang III, MD (05/05/25 14:08:56)                   Impression:      No acute process seen.      Electronically signed by: Chon Dang MD  Date:    05/05/2025  Time:    14:08               Narrative:    EXAMINATION:  XR RIBS 2 VIEW LEFT    CLINICAL HISTORY:  Pleurodynia    FINDINGS:  Rib left two views:    No pneumothorax, pleural fluid, or lung contusion seen.  No rib fracture or rib lesion seen.  No rib trauma seen.                                       X-Ray Chest PA And Lateral (Final result)  Result time 05/05/25 14:07:29      Final result by Chon Dang III, MD (05/05/25 14:07:29)                   Impression:      No acute process seen.      Electronically signed by: Chon Dang MD  Date:    05/05/2025  Time:    14:07               Narrative:    EXAMINATION:  XR CHEST PA AND LATERAL    CLINICAL HISTORY:  Pleurodynia    FINDINGS:  Chest two views:    Heart size is normal.  Lungs are clear.  The bones are noncontributory.                                       Medications   albuterol-ipratropium 2.5 mg-0.5 mg/3 mL nebulizer solution 3 mL (3 mLs Nebulization Given  5/5/25 1427)   ketorolac tablet 10 mg (10 mg Oral Given 5/5/25 1438)   benzonatate capsule 200 mg (200 mg Oral Given 5/5/25 1438)     Medical Decision Making  Amount and/or Complexity of Data Reviewed  Radiology: ordered. Decision-making details documented in ED Course.    Risk  OTC drugs.  Prescription drug management.    Medical Decision Making  Patient: 29 y.o.-year-old male  Chief Complaint:   Chief Complaint   Patient presents with    Rib Injury     Pt presents w/ a c/o of left rib pain for approximately two days. Report heavy lifting, and possibly injured himself. Denies any direct trauma to the ribs. Painful w/ inspiration.       Refer to Physical exam as documented above.     Vital Signs: Reviewed and reassuring.  Nursing Notes: Reviewed.    Differential Diagnosis Considered:  Bronchitis, Pneumonia, Sprain, Strain, Contusion, Dislocation, Fracture.       Disposition Planning  Hospitalization Considered For: cough  Patient Agreement: Patient agreeable to transfer and admission to [facility] if medically necessary.    Emergency Department Course  Treatment Administered: Physical examination and medications administered:   Medications   albuterol-ipratropium 2.5 mg-0.5 mg/3 mL nebulizer solution 3 mL (3 mLs Nebulization Given 5/5/25 1427)   ketorolac tablet 10 mg (10 mg Oral Given 5/5/25 1438)   benzonatate capsule 200 mg (200 mg Oral Given 5/5/25 1438)     Response to Treatment: Patient tolerated PO; reports improvement post-treatment.  External Data Reviewed:  Prior medical records.  Vital signs (integrated into HPI and Physical Exam).  Laboratory Studies: Ordered and reviewed.   Radiologic Studies: Ordered and reviewed as indicated.      Risk Assessment  Social determinants of health impacting care: Body mass index is 28.98 kg/m².     Shared Decision-Making  Discussed the following with the patient:  Treatment plan  Medication instructions   imaging results   Recommended Outpatient follow up  after emergency department visit  Referral for ongoing care    All questions answered. Patient actively  participated in care decisions.     Discharge Instructions  Prescriptions:   ED Prescriptions       Medication Sig Dispense Start Date End Date Auth. Provider    acetaminophen (TYLENOL) 500 MG tablet Take 1 tablet (500 mg total) by mouth every 6 (six) hours as needed for Pain (As needed for pain and fever). 30 tablet 5/5/2025 -- Shima Rome,     fluticasone propionate (FLONASE) 50 mcg/actuation nasal spray 1 spray (50 mcg total) by Each Nostril route 2 (two) times daily. 16 g 5/5/2025 -- Shima Rome, DO    benzonatate (TESSALON) 200 MG capsule Take 1 capsule (200 mg total) by mouth 3 (three) times daily as needed for Cough (As needed for cough and congestion). 30 capsule 5/5/2025 5/15/2025 Shima Rome,     azithromycin (Z-VALERIE) 250 MG tablet Take 1 tablet (250 mg total) by mouth once daily. Take first 2 tablets together, then 1 every day until finished. 6 tablet 5/5/2025 -- Shima Rome,     albuterol (VENTOLIN HFA) 90 mcg/actuation inhaler Inhale 2 puffs into the lungs every 6 (six) hours as needed for Wheezing. Rescue 18 g 5/5/2025 5/5/2026 Shima Rome,     levocetirizine (XYZAL) 5 MG tablet Take 1 tablet (5 mg total) by mouth every evening. 30 tablet 5/5/2025 5/5/2026 Shima Rome,     methocarbamoL (ROBAXIN) 500 MG Tab Take 2 tablets (1,000 mg total) by mouth 3 (three) times daily. for 5 days 30 tablet 5/5/2025 5/10/2025 Shima Rome,     diclofenac sodium (VOLTAREN) 1 % Gel Apply 2 g topically 4 (four) times daily as needed (Apply to painful area 4 times a day as needed for pain). 200 g 5/5/2025 -- Shima Rome,     ibuprofen (ADVIL,MOTRIN) 600 MG tablet Take 1 tablet (600 mg total) by mouth every 6 (six) hours as needed for Pain (Take with food as needed for mild-to-moderate pain). 20 tablet 5/5/2025 -- Shima Rome, DO          Instructions:  Fill and take medications as directed.  Return  to ED if symptoms worsen or fail to improve.  Follow-Up: PCP or specialist follow-up within 1 day.  Patient Status at Discharge:  Patient reports improvement in symptoms.     Patient Condition at Discharge  Awake, alert, oriented x4.  Speaking clearly in full sentences.  Moving all four extremities spontaneously.           I, Dr. Shima Rome, personally performed the services described in this documentation. This document was produced by a scribe under my direction and in my presence. All medical record entries made by the scribe were at my direction and in my presence.  I have reviewed the chart and agree that the record reflects my personal performance and is accurate and complete. Shima Rome, DO.     05/05/2025 4:30 PM  DISCLAIMER: This note was prepared with [employer] voice recognition transcription software. Garbled syntax, mangled pronouns, and other bizarre constructions may be attributed to that software system.           Scribe Attestation:   Scribe #1: I performed the above scribed service and the documentation accurately describes the services I performed. I attest to the accuracy of the note.  Scribe #2: I performed the above scribed service and the documentation accurately describes the services I performed. I attest to the accuracy of the note.                                 Clinical Impression:  Final diagnoses:  [R07.81] Rib pain on left side  [J20.9] Acute bronchitis, unspecified organism (Primary)  [M79.18] Musculoskeletal pain          ED Disposition Condition    Discharge Stable          ED Prescriptions       Medication Sig Dispense Start Date End Date Auth. Provider    acetaminophen (TYLENOL) 500 MG tablet Take 1 tablet (500 mg total) by mouth every 6 (six) hours as needed for Pain (As needed for pain and fever). 30 tablet 5/5/2025 -- Shima Rome DO    fluticasone propionate (FLONASE) 50 mcg/actuation nasal spray 1 spray (50 mcg total) by Each Nostril route 2 (two) times daily. 16  g 5/5/2025 -- Shima Rome,     benzonatate (TESSALON) 200 MG capsule Take 1 capsule (200 mg total) by mouth 3 (three) times daily as needed for Cough (As needed for cough and congestion). 30 capsule 5/5/2025 5/15/2025 Shima Rome DO    azithromycin (Z-VALERIE) 250 MG tablet Take 1 tablet (250 mg total) by mouth once daily. Take first 2 tablets together, then 1 every day until finished. 6 tablet 5/5/2025 -- Shima Rome DO    albuterol (VENTOLIN HFA) 90 mcg/actuation inhaler Inhale 2 puffs into the lungs every 6 (six) hours as needed for Wheezing. Rescue 18 g 5/5/2025 5/5/2026 Shima Rome DO    levocetirizine (XYZAL) 5 MG tablet Take 1 tablet (5 mg total) by mouth every evening. 30 tablet 5/5/2025 5/5/2026 Shima Rome DO    methocarbamoL (ROBAXIN) 500 MG Tab Take 2 tablets (1,000 mg total) by mouth 3 (three) times daily. for 5 days 30 tablet 5/5/2025 5/10/2025 Shima Rome DO    diclofenac sodium (VOLTAREN) 1 % Gel Apply 2 g topically 4 (four) times daily as needed (Apply to painful area 4 times a day as needed for pain). 200 g 5/5/2025 -- Shima Rome DO    ibuprofen (ADVIL,MOTRIN) 600 MG tablet Take 1 tablet (600 mg total) by mouth every 6 (six) hours as needed for Pain (Take with food as needed for mild-to-moderate pain). 20 tablet 5/5/2025 -- Shima Rome DO          Follow-up Information       Follow up With Specialties Details Why Contact Info    Mook Jenkins MD Pediatrics Schedule an appointment as soon as possible for a visit in 1 day  [address]  [place] [address] [phone]      [facility] ED Emergency Medicine   [address] [place] [id number] [phone]               [1]   Social History  Tobacco Use    Smoking status: Former     Types: Vaping with nicotine    Smokeless tobacco: Never   Substance Use Topics    Alcohol use: Yes     Comment: ocassionally    Drug use: Never        Shima Rome DO  05/05/25 [id number]

## 2025-05-05 NOTE — Clinical Note
"Tony "Tony" Brittany was seen and treated in our emergency department on 5/5/2025.  He may return to work on 05/07/2025.       If you have any questions or concerns, please don't hesitate to call.      Shima Rome, DO"

## (undated) DEVICE — NDL SAFETY 22G X 1.5 ECLIPSE

## (undated) DEVICE — PAD CAST SPECIALIST STRL 4

## (undated) DEVICE — GLOVE BIOGEL PI MICRO INDIC 7

## (undated) DEVICE — SCRUB 10% POVIDONE IODINE 4OZ

## (undated) DEVICE — DRAPE STERI-DRAPE 1000 17X11IN

## (undated) DEVICE — SPLINT PLASTER EXT FAST 4X15

## (undated) DEVICE — TOURNIQUET SB QC DP 18X4IN

## (undated) DEVICE — BANDAGE MATRIX HK LOOP 2IN 5YD

## (undated) DEVICE — SUT 4/0 18IN ETHILON BL P3

## (undated) DEVICE — SYR B-D DISP CONTROL 10CC100/C

## (undated) DEVICE — DRESSING N ADH OIL EMUL 3X3

## (undated) DEVICE — SUT 4-0 VICRYL / P-3

## (undated) DEVICE — BANDAGE MATRIX HK LOOP 4IN 5YD

## (undated) DEVICE — GLOVE BIOGEL ECLIPSE SZ 7

## (undated) DEVICE — TRAY MINOR ORTHO

## (undated) DEVICE — GLOVE BIOGEL SKINSENSE PI 7.0

## (undated) DEVICE — SEE MEDLINE ITEM 154981

## (undated) DEVICE — COVER CAMERA OPERATING ROOM

## (undated) DEVICE — SLING ARM LARGE FOAM STRAP

## (undated) DEVICE — SOL 9P NACL IRR PIC IL

## (undated) DEVICE — GAUZE SPONGE 4X4 12PLY

## (undated) DEVICE — SUT 4-0 CHROMIC GUT / P-3

## (undated) DEVICE — SEE L#120831

## (undated) DEVICE — NDL 22GA X1 1/2 REG BEVEL

## (undated) DEVICE — SPONGE DERMACEA 4X4IN 12PLY

## (undated) DEVICE — Device

## (undated) DEVICE — PAD CAST 2 IN X 4YDS STERILE

## (undated) DEVICE — DRAPE SURG W/TWL 17 5/8X23

## (undated) DEVICE — CORD BIPOLAR 12 FOOT

## (undated) DEVICE — NDL 18GA X1 1/2 REG BEVEL

## (undated) DEVICE — SEE MEDLINE ITEM 157131

## (undated) DEVICE — BANDAGE KERLIX P/P 2.25IN STER

## (undated) DEVICE — SUT 8/0 5IN ETHILON BLK MO